# Patient Record
Sex: FEMALE | Race: WHITE | Employment: UNEMPLOYED | URBAN - METROPOLITAN AREA
[De-identification: names, ages, dates, MRNs, and addresses within clinical notes are randomized per-mention and may not be internally consistent; named-entity substitution may affect disease eponyms.]

---

## 2017-01-30 ENCOUNTER — OFFICE VISIT (OUTPATIENT)
Dept: ENDOCRINOLOGY | Age: 72
End: 2017-01-30

## 2017-01-30 VITALS
HEART RATE: 93 BPM | WEIGHT: 142.6 LBS | HEIGHT: 64 IN | SYSTOLIC BLOOD PRESSURE: 178 MMHG | DIASTOLIC BLOOD PRESSURE: 75 MMHG | BODY MASS INDEX: 24.34 KG/M2

## 2017-01-30 LAB — HBA1C MFR BLD HPLC: 8 %

## 2017-01-30 RX ORDER — INSULIN GLARGINE 100 [IU]/ML
INJECTION, SOLUTION SUBCUTANEOUS
Refills: 3 | COMMUNITY
Start: 2016-11-06 | End: 2017-01-30 | Stop reason: SDUPTHER

## 2017-01-30 RX ORDER — LANCETS
EACH MISCELLANEOUS
Qty: 600 EACH | Refills: 3 | Status: SHIPPED | OUTPATIENT
Start: 2017-01-30 | End: 2018-05-29

## 2017-01-30 RX ORDER — INSULIN LISPRO 100 [IU]/ML
INJECTION, SOLUTION INTRAVENOUS; SUBCUTANEOUS
Refills: 3 | COMMUNITY
Start: 2016-11-07 | End: 2017-01-30 | Stop reason: SDUPTHER

## 2017-01-30 RX ORDER — INSULIN GLARGINE 100 [IU]/ML
19 INJECTION, SOLUTION SUBCUTANEOUS DAILY
Qty: 30 ML | Refills: 3 | Status: SHIPPED | OUTPATIENT
Start: 2017-01-30 | End: 2017-04-30

## 2017-01-30 RX ORDER — INSULIN LISPRO 100 [IU]/ML
INJECTION, SOLUTION INTRAVENOUS; SUBCUTANEOUS
Qty: 30 ML | Refills: 3 | Status: SHIPPED | OUTPATIENT
Start: 2017-01-30 | End: 2018-04-03 | Stop reason: SDUPTHER

## 2017-01-30 RX ORDER — OSELTAMIVIR PHOSPHATE 75 MG/1
CAPSULE ORAL
COMMUNITY
Start: 2017-01-17 | End: 2017-01-30 | Stop reason: ALTCHOICE

## 2017-01-30 RX ORDER — PEN NEEDLE, DIABETIC 30 GX3/16"
NEEDLE, DISPOSABLE MISCELLANEOUS
Qty: 400 PEN NEEDLE | Refills: 3 | Status: SHIPPED | OUTPATIENT
Start: 2017-01-30 | End: 2018-05-29

## 2017-01-30 RX ORDER — HYDROCODONE BITARTRATE AND ACETAMINOPHEN 5; 325 MG/1; MG/1
TABLET ORAL
Status: ON HOLD | COMMUNITY
Start: 2016-12-01 | End: 2017-10-02

## 2017-01-30 RX ORDER — IRBESARTAN AND HYDROCHLOROTHIAZIDE 150; 12.5 MG/1; MG/1
2 TABLET, FILM COATED ORAL DAILY
Qty: 180 TAB | Refills: 3 | Status: SHIPPED | OUTPATIENT
Start: 2017-01-30 | End: 2017-11-06 | Stop reason: SDUPTHER

## 2017-01-30 NOTE — PROGRESS NOTES
Chief Complaint   Patient presents with    New Patient    Diabetes     Type 1  diabetes. Last A1C - 7.2% - May 2016. Patient is testing BS 4 times - before meals and at bedtime.  Diabetic Foot Exam     Patient states that Dr. Hawa Up performed last foot exam.     Other     Patient states that she had an eye exam a week ago - [facility]. HPI  Ortega Goodrich returns for follow-up of her type 1 diabetes mellitus. I last saw her in [place]. Her last A1c was 7.2%. Her A1c today is 8.0%. Unfortunately she is experienced high blood sugars over the holidays. She also had the flu the fell on the ice and broke her left radial head. This is now healed and she is back to exercising. But because of the fracture, she was unable to exercise for quite some time. And then of course the holidays themselves contributed to hyperglycemia. She continues to take Lantus insulin 19 units in the morning along with Humalog insulin 6-8 units with each of her 3 meals. She is back on the elliptical every day in the morning. She brought in a print out of her blood sugars in the average blood sugar for the last 30 days is 172 which is consistent with an A1c of 8%. Blood sugars for the last 2 weeks are much better now that she is back to exercising. Her diet is really unchanged. She remains concerned about her blood pressure which does continue to fluctuate. She is currently on Avapro 150/12.5 2 tablets daily although in the past we have had to cut that back for reasons that are not entirely clear to prevent hypotension. Most recently she has had home blood pressures that are in the 130/50. ROS  Patient denies any episodes of severe hypoglycemia.   She also denies chest pain, shortness of breath, constipation or diarrhea    Visit Vitals    /75 (BP 1 Location: Right arm, BP Patient Position: Sitting)    Pulse 93    Ht 5' 4\" (1.626 m)    Wt 142 lb 9.6 oz (64.7 kg)    BMI 24.48 kg/m2 Physical Examination: General appearance - alert, well appearing, and in no distress  Mental status - alert, oriented to person, place, and time  Neck - supple, no significant adenopathy, thyroid exam: thyroid is normal in size without nodules or tenderness  Chest - clear to auscultation, no wheezes, rales or rhonchi, symmetric air entry  Heart - normal rate and regular rhythm, systolic murmur 2/6 at 2nd left intercostal space  Abdomen - soft, nontender, nondistended, no masses or organomegaly  Extremities - peripheral pulses normal, no pedal edema, no clubbing or cyanosis    Diabetic foot exam:     Left: Reflexes 4+     Vibratory sensation normal    Filament test normal sensation with micro filament   Pulse DP: 2+ (normal)   Pulse PT: 2+ (normal)   Deformities: None  Right: Reflexes 4+   Vibratory sensation normal   Filament test normal sensation with micro filament   Pulse DP: 2+ (normal)   Pulse PT: 2+ (normal)   Deformities: None      ASSESSMENT & PLAN  I believe Georgia's blood sugars and A1c will improve going forward. She is back to exercising and her energy level is good. She is concerned about her blood pressure as she has been in the past.  I reviewed her imaging studies that were performed in July 2014. Those tests were unremarkable. We will continue the Avapro at its current dosing for now. We continue the insulin also at its current dosing. I will see her back in 4 months.

## 2017-01-31 RX ORDER — LEVOTHYROXINE SODIUM 150 UG/1
150 TABLET ORAL DAILY
Qty: 90 TAB | Refills: 3 | Status: SHIPPED | OUTPATIENT
Start: 2017-01-31 | End: 2018-01-03 | Stop reason: SDUPTHER

## 2017-01-31 NOTE — TELEPHONE ENCOUNTER
----- Message from Pauly Buitrago sent at 1/31/2017  2:16 PM EST -----  Regarding: phone call  Patient called to ask whether her Levothyroxine was called into her pharmacy yesterday? She didn't get it. Her number is:  [phone]. Thanks [employer].

## 2017-02-18 ENCOUNTER — TELEPHONE (OUTPATIENT)
Dept: ENDOCRINOLOGY | Age: 72
End: 2017-02-18

## 2017-02-18 NOTE — TELEPHONE ENCOUNTER
Mrs Marisela Hardin paged after she attempted to do an insulin injection with Humalog, had difficulty depressing the pen (much more resistance/difficulty) and then noted the metal needle was missing. She was concerned that perhaps the needle broke off during the injection. She did not feel anything, but often does not feel her injections. Importantly, she says she frequently reuses pen needles. After the injection she inspected her abdomen and could not see or feel any evidence of a needle. My impression:  Given that she reuses her needles, suspect needle tip was bent and then broke off prior to today's injection. This would explain why she had resistance trying to inject insulin. Considering this, I think it is most likely pen needle broke off prior to her attempting the injection before lunch. Although highly unlikely,  IF pen needle were to have broke off under her skin, recommend she observe area of attempted injection. If an infection or abscess were to arise she can see her GP and discuss possibility that foreign body may be under skin. Recommended she inspect her pen needles prior to using in the future. Because she was quite sure no insulin came out of pen, she had already repeated the lunch time injection to provide the insulin for the meal prior to paging me.

## 2017-05-26 ENCOUNTER — OFFICE VISIT (OUTPATIENT)
Dept: ENDOCRINOLOGY | Age: 72
End: 2017-05-26

## 2017-05-26 VITALS
WEIGHT: 133.4 LBS | SYSTOLIC BLOOD PRESSURE: 176 MMHG | HEIGHT: 64 IN | BODY MASS INDEX: 22.77 KG/M2 | HEART RATE: 84 BPM | DIASTOLIC BLOOD PRESSURE: 76 MMHG

## 2017-05-26 DIAGNOSIS — E03.9 ACQUIRED HYPOTHYROIDISM: ICD-10-CM

## 2017-05-26 DIAGNOSIS — E10.9 TYPE 1 DIABETES MELLITUS WITHOUT COMPLICATION (HCC): Primary | ICD-10-CM

## 2017-05-26 LAB — HBA1C MFR BLD HPLC: 7.8 %

## 2017-05-26 NOTE — PROGRESS NOTES
Get Edwards returns for follow-up of her type 1 diabetes mellitus with excellent glucose control on basal bolus insulin therapy. She is currently taking Lantus insulin 16 units in the morning and Humalog insulin 4-6 units with her meals. She continues to exercise on her elliptical every morning and remain active throughout the day. She records her blood sugars many times a day. Her average blood sugar on her readout is 148. Her A1c today is 7.8% and that is surprisingly high based on her blood sugar profile. In fact she has a number of low blood sugars in the 40s and 50s that she has to treat. Her diet is really unchanged. She denies any episodes of severe hypoglycemia but again has fairly frequent episodes of mild hypoglycemia. Examination  Blood pressure 176/76  Pulse 84  Weight 133  Lungs clear  Heart reveals a regular rate and rhythm with a 2/6 systolic ejection murmur  Abdomen soft and nontender  Extremities unremarkable  Feet are dry without ulceration or tissue breakdown and there is normal vibratory and light touch sensation    Impression type 1 diabetes mellitus with an A1c higher than her blood sugars would suggest.  Plan: All laboratory tests were obtained today and I also obtained a serum fructosamine level to determine whether her average blood sugar is lower than the A1c reflects today. I will call her with results of the test.    We spent more than 25 mintutes face to face, more than 50% was in counseling regarding diet, exercise and carbohydrate intake.

## 2017-05-27 LAB
ALBUMIN SERPL-MCNC: 4.1 G/DL (ref 3.6–4.8)
ALBUMIN/GLOB SERPL: 1.6 {RATIO} (ref 1.2–2.2)
ALP SERPL-CCNC: 72 IU/L (ref 39–117)
ALT SERPL-CCNC: 15 IU/L (ref 0–32)
AST SERPL-CCNC: 15 IU/L (ref 0–40)
BILIRUB SERPL-MCNC: 0.2 MG/DL (ref 0–1.2)
BUN SERPL-MCNC: 30 MG/DL (ref 8–27)
BUN/CREAT SERPL: 23 (ref 12–28)
CALCIUM SERPL-MCNC: 9 MG/DL (ref 8.7–10.3)
CHLORIDE SERPL-SCNC: 98 MMOL/L (ref 96–106)
CO2 SERPL-SCNC: 20 MMOL/L (ref 18–29)
CREAT SERPL-MCNC: 1.31 MG/DL (ref 0.57–1)
ERYTHROCYTE [DISTWIDTH] IN BLOOD BY AUTOMATED COUNT: 13.2 % (ref 12.3–15.4)
FERRITIN SERPL-MCNC: 100 NG/ML (ref 15–150)
FRUCTOSAMINE SERPL-SCNC: 346 UMOL/L (ref 0–285)
GLOBULIN SER CALC-MCNC: 2.6 G/DL (ref 1.5–4.5)
GLUCOSE SERPL-MCNC: 229 MG/DL (ref 65–99)
HCT VFR BLD AUTO: 31.7 % (ref 34–46.6)
HGB BLD-MCNC: 10 G/DL (ref 11.1–15.9)
INTERPRETATION: NORMAL
MCH RBC QN AUTO: 29.3 PG (ref 26.6–33)
MCHC RBC AUTO-ENTMCNC: 31.5 G/DL (ref 31.5–35.7)
MCV RBC AUTO: 93 FL (ref 79–97)
PLATELET # BLD AUTO: 351 X10E3/UL (ref 150–379)
POTASSIUM SERPL-SCNC: 5.7 MMOL/L (ref 3.5–5.2)
PROT SERPL-MCNC: 6.7 G/DL (ref 6–8.5)
RBC # BLD AUTO: 3.41 X10E6/UL (ref 3.77–5.28)
SODIUM SERPL-SCNC: 137 MMOL/L (ref 134–144)
T4 FREE SERPL-MCNC: 1.84 NG/DL (ref 0.82–1.77)
TSH SERPL DL<=0.005 MIU/L-ACNC: 0.1 UIU/ML (ref 0.45–4.5)
WBC # BLD AUTO: 9.3 X10E3/UL (ref 3.4–10.8)

## 2017-09-29 ENCOUNTER — OFFICE VISIT (OUTPATIENT)
Dept: ENDOCRINOLOGY | Age: 72
End: 2017-09-29

## 2017-09-29 VITALS
HEIGHT: 64 IN | HEART RATE: 77 BPM | DIASTOLIC BLOOD PRESSURE: 79 MMHG | BODY MASS INDEX: 22.81 KG/M2 | SYSTOLIC BLOOD PRESSURE: 185 MMHG | WEIGHT: 133.6 LBS

## 2017-09-29 DIAGNOSIS — E10.9 TYPE 1 DIABETES MELLITUS WITHOUT COMPLICATION (HCC): Primary | ICD-10-CM

## 2017-09-29 LAB — HBA1C MFR BLD HPLC: 8 %

## 2017-09-29 NOTE — PROGRESS NOTES
Ms. Javier Zhong returns for follow-up of her type 1 diabetes mellitus. She is currently on Lantus insulin recently decreased from 16 units to 15 units and Humalog insulin which she takes 2-4 units with her meals. She is recently gone on a very low-carb diet and increased activity regimen and this is resulted in some low blood sugars. Despite that her A1c today is 8.0%. She is on a diet called the whole 30 diet which is basically no dairy, no grains, and no processed foods. She feels great on the diet but she has a lot of low blood sugars on her download. She goes to the gym Monday Wednesday Friday and does 30-45 minutes of strength training along with 30 minutes of elliptical.  Every other day of the week she just does the 30 minutes of elliptical.  Breakfast can be an omelette with vegetables or fruit. Lunch can be soup or fruit and salad. Supper can be a similar meal or she can go out and they will have salmon with roast vegetables and salad. She is playing pickle ball at night. Review of her blood sugar profile indicates lots of episodes of blood sugars in the 40s and 50s. She has one episode where she thought all night to get her blood sugar back up. We believe that she probably took 2 doses of Humalog for her supper meal that night. Examination  Blood pressure 185/79  Pulse 77  Weight 133  Lungs clear  Heart reveals a regular rate and rhythm without murmurs rubs gallops  Abdomen soft and nontender  Extremities unremarkable. The feet are dry without ulceration or tissue breakdown and there is normal vibratory and light touch sensation. Impression   type 1 diabetes mellitus  Hypertension    Plan:  Regarding her diabetes, we have decreased her Lantus to 14 units and continue to Humalog at 2-4 units with her meals. Admittedly these are very low-carb meals.   I have asked her to send me blood sugar readings in 1 week so that we can see if the lower dose of Lantus will eliminate the low blood sugars. Regarding her hypertension, she has discovered that if she takes her usual dose of irbesartan, she has supine and upright hypotension particularly with exercise so she has several days where she has not taken the irbesartan at all and after several days her blood sugar begins to go up. So we have elected to try half a tablet of irbesartan 150 mg and see if this normalizes her blood pressure without hypotension. We will see her back in 4 months or sooner as needed. End dictation    We spent more than 25 mintutes face to face, more than 50% was in counseling regarding diet, exercise and carbohydrate intake.

## 2017-10-02 ENCOUNTER — ANESTHESIA EVENT (OUTPATIENT)
Dept: ENDOSCOPY | Age: 72
End: 2017-10-02
Payer: MEDICARE

## 2017-10-02 ENCOUNTER — ANESTHESIA (OUTPATIENT)
Dept: ENDOSCOPY | Age: 72
End: 2017-10-02
Payer: MEDICARE

## 2017-10-02 ENCOUNTER — HOSPITAL ENCOUNTER (OUTPATIENT)
Age: 72
Setting detail: OUTPATIENT SURGERY
Discharge: HOME | End: 2017-10-02
Attending: INTERNAL MEDICINE | Admitting: INTERNAL MEDICINE
Payer: MEDICARE

## 2017-10-02 VITALS
DIASTOLIC BLOOD PRESSURE: 71 MMHG | BODY MASS INDEX: 22.71 KG/M2 | HEIGHT: 64 IN | OXYGEN SATURATION: 100 % | TEMPERATURE: 98 F | RESPIRATION RATE: 14 BRPM | WEIGHT: 133 LBS | HEART RATE: 66 BPM | SYSTOLIC BLOOD PRESSURE: 153 MMHG

## 2017-10-02 LAB
GLUCOSE BLD STRIP.AUTO-MCNC: 100 MG/DL (ref 65–100)
SERVICE CMNT-IMP: NORMAL

## 2017-10-02 PROCEDURE — 76060000031 HC ANESTHESIA FIRST 0.5 HR: Performed by: INTERNAL MEDICINE

## 2017-10-02 PROCEDURE — 77030009426 HC FCPS BIOP ENDOSC BSC -B: Performed by: INTERNAL MEDICINE

## 2017-10-02 PROCEDURE — 76040000019: Performed by: INTERNAL MEDICINE

## 2017-10-02 PROCEDURE — 77030027957 HC TBNG IRR ENDOGTR BUSS -B: Performed by: INTERNAL MEDICINE

## 2017-10-02 PROCEDURE — 88305 TISSUE EXAM BY PATHOLOGIST: CPT | Performed by: INTERNAL MEDICINE

## 2017-10-02 PROCEDURE — 82962 GLUCOSE BLOOD TEST: CPT

## 2017-10-02 PROCEDURE — 74011250636 HC RX REV CODE- 250/636

## 2017-10-02 RX ORDER — SODIUM CHLORIDE 0.9 % (FLUSH) 0.9 %
5-10 SYRINGE (ML) INJECTION AS NEEDED
Status: DISCONTINUED | OUTPATIENT
Start: 2017-10-02 | End: 2017-10-02 | Stop reason: HOSPADM

## 2017-10-02 RX ORDER — FLUMAZENIL 0.1 MG/ML
0.2 INJECTION INTRAVENOUS
Status: DISCONTINUED | OUTPATIENT
Start: 2017-10-02 | End: 2017-10-02 | Stop reason: HOSPADM

## 2017-10-02 RX ORDER — DEXTROMETHORPHAN/PSEUDOEPHED 2.5-7.5/.8
1.2 DROPS ORAL
Status: DISCONTINUED | OUTPATIENT
Start: 2017-10-02 | End: 2017-10-02 | Stop reason: HOSPADM

## 2017-10-02 RX ORDER — SODIUM CHLORIDE 9 MG/ML
100 INJECTION, SOLUTION INTRAVENOUS CONTINUOUS
Status: DISCONTINUED | OUTPATIENT
Start: 2017-10-02 | End: 2017-10-02 | Stop reason: HOSPADM

## 2017-10-02 RX ORDER — MIDAZOLAM HYDROCHLORIDE 1 MG/ML
.25-1 INJECTION, SOLUTION INTRAMUSCULAR; INTRAVENOUS
Status: DISCONTINUED | OUTPATIENT
Start: 2017-10-02 | End: 2017-10-02 | Stop reason: HOSPADM

## 2017-10-02 RX ORDER — FENTANYL CITRATE 50 UG/ML
100 INJECTION, SOLUTION INTRAMUSCULAR; INTRAVENOUS
Status: DISCONTINUED | OUTPATIENT
Start: 2017-10-02 | End: 2017-10-02 | Stop reason: HOSPADM

## 2017-10-02 RX ORDER — DIPHENHYDRAMINE HYDROCHLORIDE 50 MG/ML
50 INJECTION, SOLUTION INTRAMUSCULAR; INTRAVENOUS ONCE
Status: DISCONTINUED | OUTPATIENT
Start: 2017-10-02 | End: 2017-10-02 | Stop reason: HOSPADM

## 2017-10-02 RX ORDER — SODIUM CHLORIDE 0.9 % (FLUSH) 0.9 %
5-10 SYRINGE (ML) INJECTION EVERY 8 HOURS
Status: DISCONTINUED | OUTPATIENT
Start: 2017-10-02 | End: 2017-10-02 | Stop reason: HOSPADM

## 2017-10-02 RX ORDER — NALOXONE HYDROCHLORIDE 0.4 MG/ML
0.4 INJECTION, SOLUTION INTRAMUSCULAR; INTRAVENOUS; SUBCUTANEOUS
Status: DISCONTINUED | OUTPATIENT
Start: 2017-10-02 | End: 2017-10-02 | Stop reason: HOSPADM

## 2017-10-02 RX ORDER — EPINEPHRINE 0.1 MG/ML
1 INJECTION INTRACARDIAC; INTRAVENOUS
Status: DISCONTINUED | OUTPATIENT
Start: 2017-10-02 | End: 2017-10-02 | Stop reason: HOSPADM

## 2017-10-02 RX ORDER — PROPOFOL 10 MG/ML
INJECTION, EMULSION INTRAVENOUS AS NEEDED
Status: DISCONTINUED | OUTPATIENT
Start: 2017-10-02 | End: 2017-10-02 | Stop reason: HOSPADM

## 2017-10-02 RX ORDER — SODIUM CHLORIDE 9 MG/ML
INJECTION, SOLUTION INTRAVENOUS
Status: DISCONTINUED | OUTPATIENT
Start: 2017-10-02 | End: 2017-10-02 | Stop reason: HOSPADM

## 2017-10-02 RX ORDER — ATROPINE SULFATE 0.1 MG/ML
0.5 INJECTION INTRAVENOUS
Status: DISCONTINUED | OUTPATIENT
Start: 2017-10-02 | End: 2017-10-02 | Stop reason: HOSPADM

## 2017-10-02 RX ADMIN — PROPOFOL 50 MG: 10 INJECTION, EMULSION INTRAVENOUS at 09:15

## 2017-10-02 RX ADMIN — SODIUM CHLORIDE: 9 INJECTION, SOLUTION INTRAVENOUS at 09:10

## 2017-10-02 RX ADMIN — PROPOFOL 100 MG: 10 INJECTION, EMULSION INTRAVENOUS at 09:13

## 2017-10-02 RX ADMIN — PROPOFOL 50 MG: 10 INJECTION, EMULSION INTRAVENOUS at 09:17

## 2017-10-02 RX ADMIN — PROPOFOL 50 MG: 10 INJECTION, EMULSION INTRAVENOUS at 09:21

## 2017-10-02 NOTE — ANESTHESIA PREPROCEDURE EVALUATION
Anesthetic History               Review of Systems / Medical History  Patient summary reviewed, nursing notes reviewed and pertinent labs reviewed    Pulmonary                Comments: Seasonal allergy cough   Neuro/Psych              Cardiovascular    Hypertension                   GI/Hepatic/Renal               Comments: diverticulosis Endo/Other    Diabetes: well controlled, type 1, using insulin         Other Findings            Physical Exam    Airway  Mallampati: I  TM Distance: > 6 cm  Neck ROM: normal range of motion   Mouth opening: Normal     Cardiovascular    Rhythm: regular  Rate: normal         Dental  No notable dental hx       Pulmonary  Breath sounds clear to auscultation               Abdominal         Other Findings            Anesthetic Plan    ASA: 2  Anesthesia type: MAC          Induction: Intravenous  Anesthetic plan and risks discussed with: Patient

## 2017-10-02 NOTE — ANESTHESIA POSTPROCEDURE EVALUATION
Post-Anesthesia Evaluation and Assessment    Patient: Zeferino Flores MRN: [id number]  SSN: [id number]    YOB: 1945  Age: 81 y.o. Sex: female       Cardiovascular Function/Vital Signs  Visit Vitals    /52    Pulse 68    Temp 36.7 °C (98 °F)    Resp 16    Ht 5' 4\" (1.626 m)    Wt 60.3 kg (133 lb)    SpO2 100%    BMI 22.83 kg/m2       Patient is status post MAC anesthesia for Procedure(s):  COLONOSCOPY  ENDOSCOPIC POLYPECTOMY. Nausea/Vomiting: None    Postoperative hydration reviewed and adequate. Pain:  Pain Scale 1: Numeric (0 - 10) (10/02/17 0852)  Pain Intensity 1: 0 (10/02/17 0857)   Managed    Neurological Status: At baseline    Mental Status and Level of Consciousness: Arousable    Pulmonary Status:   O2 Device: Nasal cannula (10/02/17 0921)   Adequate oxygenation and airway patent    Complications related to anesthesia: None    Post-anesthesia assessment completed.  No concerns    Signed By: Michelle Canela MD     October 2, 2017

## 2017-10-02 NOTE — H&P
[address] [facility], [address] [facility]      History and Physical       NAME:  Esequiel Menjivar   :   1945   MRN:   [id number]             History of Present Illness:  Patient is a 81 y.o. who is seen for personal history of colon polyps. Last colonoscopy was in  at which time polyps were removed. PMH:  Past Medical History:   Diagnosis Date    Hypertension     Hypothyroidism     Type I diabetes mellitus ([address]        PSH:  Past Surgical History:   Procedure Laterality Date    HX APPENDECTOMY      HX  SECTION      HX TONSILLECTOMY      HX TUBAL LIGATION         Allergies: Allergies   Allergen Reactions    Penicillins Unknown (comments)       Home Medications:  Prior to Admission Medications   Prescriptions Last Dose Informant Patient Reported? Taking? HUMALOG KWIKPEN 100 unit/mL kwikpen 10/1/2017 at Unknown time  No Yes   Sig: INJECT 6 TO 12 UNITS SUBCUTANEOUSLY 3 TIMES A DAY BEFORE MEALS AS DIRECTED  Indications: type 1 diabetes mellitus   Insulin Needles, Disposable, 31 gauge x 5/16\" ndle   No No   Sig: Use as directed with insulin pen 4 times daily   Lancets misc 10/1/2017 at Unknown time  No Yes   Sig: Monitor blood sugar 6 times daily   glucose blood VI test strips (ONETOUCH VERIO) strip 10/1/2017 at Unknown time  No Yes   Sig: Monitor blood sugar 6 times daily   irbesartan-hydroCHLOROthiazide (AVALIDE) 150-12.5 mg per tablet 10/1/2017 at Unknown time  No Yes   Sig: Take 2 Tabs by mouth daily. levothyroxine (SYNTHROID) 150 mcg tablet 2017  No No   Sig: Take 1 Tab by mouth daily.       Facility-Administered Medications: None       Hospital Medications:  Current Facility-Administered Medications   Medication Dose Route Frequency    0.9% sodium chloride infusion  100 mL/hr IntraVENous CONTINUOUS    sodium chloride (NS) flush 5-10 mL  5-10 mL IntraVENous Q8H    sodium chloride (NS) flush 5-10 mL  5-10 mL IntraVENous PRN    midazolam (VERSED) injection 0.25-10 mg  0.25-10 mg IntraVENous Multiple    fentaNYL citrate (PF) injection 100 mcg  100 mcg IntraVENous Multiple    naloxone (NARCAN) injection 0.4 mg  0.4 mg IntraVENous Multiple    flumazenil (ROMAZICON) 0.1 mg/mL injection 0.2 mg  0.2 mg IntraVENous Multiple    simethicone (MYLICON) 74OH/8.2VF oral drops 80 mg  1.2 mL Oral Multiple    diphenhydrAMINE (BENADRYL) injection 50 mg  50 mg IntraVENous ONCE    atropine injection 0.5 mg  0.5 mg IntraVENous ONCE PRN    EPINEPHrine (ADRENALIN) 0.1 mg/mL syringe 1 mg  1 mg Endoscopically ONCE PRN       Social History:  Social History   Substance Use Topics    Smoking status: Never Smoker    Smokeless tobacco: Never Used    Alcohol use No       Family History:  No family history on file. Review of Systems:      Constitutional: negative fever, negative chills, negative weight loss  Eyes:   negative visual changes  ENT:   negative sore throat, tongue or lip swelling  Respiratory:  negative cough, negative dyspnea  Cards:  negative for chest pain, palpitations, lower extremity edema  GI:   See HPI  :  negative for frequency, dysuria  Integument:  negative for rash and pruritus  Heme:  negative for easy bruising and gum/nose bleeding  Musculoskel: negative for myalgias,  back pain and muscle weakness  Neuro: negative for headaches, dizziness, vertigo  Psych:  negative for feelings of anxiety, depression       Objective:   Patient Vitals for the past 8 hrs:   BP Temp Pulse Resp SpO2 Height Weight   10/02/17 0852 (!) 205/80 97.8 °F (36.6 °C) 73 20 98 % 5' 4\" (1.626 m) 60.3 kg (133 lb)             EXAM:     NEURO-a&o   HEENT-wnl   LUNGS-clear    COR-regular rate and rhythym     ABD-soft , no tenderness, no rebound, good bs     EXT-no edema     Data Review     No results for input(s): WBC, HGB, HCT, PLT, HGBEXT, HCTEXT, PLTEXT in the last 72 hours. No results for input(s): NA, K, CL, CO2, BUN, CREA, GLU, PHOS, CA in the last 72 hours.   No results for input(s): SGOT, GPT, AP, TBIL, TP, ALB, GLOB, GGT, AML, LPSE in the last 72 hours. No lab exists for component: AMYP, HLPSE  No results for input(s): INR, PTP, APTT in the last 72 hours. No lab exists for component: INREXT       Assessment:   · History of colon polyps     Patient Active Problem List   Diagnosis Code    Type 1 diabetes mellitus without complication ([facility] N41.7     Plan:   · Endoscopic procedure with MAC     Signed By: Елена Ayala.  Olive Lock MD     10/2/2017  9:04 AM

## 2017-10-02 NOTE — DISCHARGE INSTRUCTIONS
[address]   [id number]  1945    Discomfort:  Redness at IV site- apply warm compress to area; if redness or soreness persist- contact your physician  There may be a slight amount of blood passed from the rectum  Gaseous discomfort- walking, belching will help relieve any discomfort  You may not operate a vehicle for 12 hours  You may not engage in an occupation involving machinery or appliances for rest of today  You may not drink alcoholic beverages for at least 12 hours  Avoid making any critical decisions for at least 24 hour  DIET:  You may resume your regular diet - however -  remember your colon is empty and a heavy meal will produce gas. Avoid these foods:  vegetables, fried / greasy foods, carbonated drinks     ACTIVITY:  You may  resume your normal daily activities it is recommended that you spend the remainder of the day resting -  avoid any strenuous activity. CALL M.D. ANY SIGN OF:   Increasing pain, nausea, vomiting  Abdominal distension (swelling)  New increased bleeding (oral or rectal)  Fever (chills)  Pain in chest area  Bloody discharge from nose or mouth  Shortness of breath      Follow-up Instructions:   Call Dr. Bereket Evangelista for any questions or problems at [address] [place] [address] St:   Your colonoscopy showed one polyp, which was removed. We will contact you about the pathology results and when your next colonoscopy will be due. Telephone # [phone]      Signed By: Stephen Harrison.  Hilary Garibay MD     10/2/2017  9:31 AM       DISCHARGE SUMMARY from Nurse    The following personal items collected during your admission are returned to you:   Dental Appliance: Dental Appliances: None  Vision: Visual Aid: None  Hearing Aid:    Jewelry:    Clothing:    Other Valuables:    Valuables sent to safe:

## 2017-10-02 NOTE — PERIOP NOTES

## 2017-10-02 NOTE — PROCEDURES
[address] [facility], [address] [facility]        Colonoscopy Operative Report    Alee Butler  [id number]  1945      Procedure Type:   Colonoscopy with polypectomy (cold biopsy)     Indications:    Personal history of colon polyps (screening only)         Pre-operative Diagnosis: see indication above    Post-operative Diagnosis:  See findings below    :  Mandie Quiroz MD      Referring Provider: Regla Gilliland MD      Sedation:  MAC anesthesia Propofol      Procedure Details:  After informed consent was obtained with all risks and benefits of procedure explained and preoperative exam completed, the patient was taken to the endoscopy suite and placed in the left lateral decubitus position. Upon sequential sedation as per above, a digital rectal exam was performed demonstrating internal hemorrhoids. The Olympus pediatric videocolonoscope  was inserted in the rectum and carefully advanced to the cecum, which was identified by the ileocecal valve and appendiceal orifice. The cecum was identified by the ileocecal valve and appendiceal orifice. The quality of preparation was good. The colonoscope was slowly withdrawn with careful evaluation between folds. Retroflexion in the rectum was completed . Findings:   Rectum: small internal hemorrhoids  Sigmoid: normal  Descending Colon: normal  Transverse Colon: normal  Ascending Colon: single sessile 4 mm polyp - removed with cold biopsy forceps  Cecum: normal  Terminal Ileum: not intubated      Specimen Removed:  1. Ascending colon polyp    Complications: None. EBL:  None. Impression:     1. Ascending colon polyp - removed  2. Small internal hemorrhoids    Recommendations:  1. Follow up surgical pathology  2. Repeat colonoscopy in 5 years  3. High fiber diet education    Signed By: Mandie Levy.  Bj Quiroz MD     10/2/2017  9:34 AM

## 2017-10-02 NOTE — IP AVS SNAPSHOT
[address] 
[phone] Patient: Alee Butler MRN: [id number] ECX:3/6/[birth year]92 You are allergic to the following Allergen Reactions Penicillins Unknown (comments) Recent Documentation Height Weight BMI OB Status Smoking Status 1.626 m 60.3 kg 22.83 kg/m2 Postmenopausal Never Smoker Emergency Contacts Name Discharge Info Relation Home Work Mobile EstelaMarlo rosenbaum A DISCHARGE CAREGIVER [3] Spouse [3] [phone] [address]  Patient [10] [phone] About your hospitalization You were admitted on:  October 2, 2017 You last received care in the:  [facility] ENDOSCOPY You were discharged on:  October 2, 2017 Unit phone number:  [phone] Why you were hospitalized Your primary diagnosis was:  Not on File Providers Seen During Your Hospitalizations Provider Role Specialty Primary office phone Jerry Quiroz MD Attending Provider Gastroenterology [phone] Your Primary Care Physician (PCP) Primary Care Physician Office Phone Office Fax Elian Avila [phone] Follow-up Information None Current Discharge Medication List  
  
CONTINUE these medications which have NOT CHANGED Dose & Instructions Dispensing Information Comments Morning Noon Evening Bedtime  
 glucose blood VI test strips strip Commonly known as:  Jillian Mix Your last dose was: Your next dose is:    
   
   
 Monitor blood sugar 6 times daily Quantity:  600 Strip Refills:  3 HumaLOG KwikPen 100 unit/mL kwikpen Generic drug:  insulin lispro Your last dose was: Your next dose is: INJECT 6 TO 12 UNITS SUBCUTANEOUSLY 3 TIMES A DAY BEFORE MEALS AS DIRECTED  Indications: type 1 diabetes mellitus Quantity:  30 mL Refills:  3 Insulin Needles (Disposable) 31 gauge x 5/16\" Ndle Your last dose was: Your next dose is:    
   
   
 Use as directed with insulin pen 4 times daily Quantity:  400 Pen Needle Refills:  3  
     
   
   
   
  
 irbesartan-hydroCHLOROthiazide 150-12.5 mg per tablet Commonly known as:  AVALIDE Your last dose was: Your next dose is:    
   
   
 Dose:  2 Tab Take 2 Tabs by mouth daily. Quantity:  180 Tab Refills:  3 Lancets Misc Your last dose was: Your next dose is:    
   
   
 Monitor blood sugar 6 times daily Quantity:  600 Each Refills:  3  
     
   
   
   
  
 levothyroxine 150 mcg tablet Commonly known as:  SYNTHROID Your last dose was: Your next dose is:    
   
   
 Dose:  150 mcg Take 1 Tab by mouth daily. Quantity:  90 Tab Refills:  3 Discharge Instructions [address] 
[address] COLON DISCHARGE INSTRUCTIONS Kat Palmer [id number] 
1945 Discomfort: 
Redness at IV site- apply warm compress to area; if redness or soreness persist- contact your physician There may be a slight amount of blood passed from the rectum Gaseous discomfort- walking, belching will help relieve any discomfort You may not operate a vehicle for 12 hours You may not engage in an occupation involving machinery or appliances for rest of today You may not drink alcoholic beverages for at least 12 hours Avoid making any critical decisions for at least 24 hour DIET: 
You may resume your regular diet  however -  remember your colon is empty and a heavy meal will produce gas. Avoid these foods:  vegetables, fried / greasy foods, carbonated drinks ACTIVITY: 
You may  resume your normal daily activities it is recommended that you spend the remainder of the day resting -  avoid any strenuous activity. CALL [facility] ANY SIGN OF: Increasing pain, nausea, vomiting Abdominal distension (swelling) New increased bleeding (oral or rectal) Fever (chills) Pain in chest area Bloody discharge from nose or mouth Shortness of breath Follow-up Instructions: 
 Call Dr. Bryce Harp for any questions or problems at [phone] ENDOSCOPY FINDINGS: 
 Your colonoscopy showed one polyp, which was removed. We will contact you about the pathology results and when your next colonoscopy will be due. Telephone # [phone] Signed By: Demetrius Chamberlain. Cesario Spencer MD   
 10/2/2017  9:31 AM 
  
 
DISCHARGE SUMMARY from Nurse The following personal items collected during your admission are returned to you:  
Dental Appliance: Dental Appliances: None Vision: Visual Aid: None Hearing Aid:   
Jewelry:   
Clothing:   
Other Valuables:   
Valuables sent to safe:   
 
 
 
Discharge Orders None Introducing [facility]! Serafin Moreau introduces [employer] patient portal. Now you can access parts of your medical record, email your doctor's office, and request medication refills online. 1. In your internet browser, go to [email or web address] 2. Click on the First Time User? Click Here link in the Sign In box. You will see the New Member Sign Up page. 3. Enter your [employer] Access Code exactly as it appears below. You will not need to use this code after youve completed the sign-up process. If you do not sign up before the expiration date, you must request a new code. · [employer] Access Code: [email or web address] Expires: 12/31/2017  9:47 AM 
 
4. Enter the last four digits of your Social Security Number (xxxx) and Date of Birth (mm/dd/yyyy) as indicated and click Submit. You will be taken to the next sign-up page. 5. Create a [employer] ID. This will be your [employer] login ID and cannot be changed, so think of one that is secure and easy to remember. 6. Create a [employer]se password. You can change your password at any time. 7. Enter your Password Reset Question and Answer. This can be used at a later time if you forget your password. 8. Enter your e-mail address. You will receive e-mail notification when new information is available in [address] 9. Click Sign Up. You can now view and download portions of your medical record. 10. Click the Download Summary menu link to download a portable copy of your medical information. If you have questions, please visit the Frequently Asked Questions section of the [employer] website. Remember, [employer]se is NOT to be used for urgent needs. For medical emergencies, dial 911. Now available from your iPhone and Android! General Information Please provide this summary of care documentation to your next provider. Patient Signature:  ____________________________________________________________ Date:  ____________________________________________________________  
  
Pastora López Provider Signature:  ____________________________________________________________ Date:  ____________________________________________________________

## 2017-10-02 NOTE — ROUTINE PROCESS
Yahir Palmer  1945  [id number]    Situation:  Verbal report received from: Roosvelt Dubin  Procedure: Procedure(s):  COLONOSCOPY  ENDOSCOPIC POLYPECTOMY    Background:    Preoperative diagnosis: POLYPS  Postoperative diagnosis: Ascending colon polyp  Internal Hemorrhoids    :  Dr. Millie Honeycutt  Assistant(s): Endoscopy Technician-1: Eugenio Goins  Endoscopy RN-1: Baltazar Vargas RN    Specimens:   ID Type Source Tests Collected by Time Destination   1 : polyp Preservative Colon, Ascending  Jerry Landon MD 10/2/2017 7662 Pathology     H. Pylori  no    Assessment:  Intra-procedure medications   Anesthesia gave intra-procedure sedation and medications, see anesthesia flow sheet yes    Intravenous fluids: NS@ KVO     Vital signs stable yes    Abdominal assessment: round and soft yes    Recommendation:  Discharge patient per MD order yes .   Return to floor na  Family or Friend  fam  Permission to share finding with family or friend yes

## 2017-11-06 RX ORDER — IRBESARTAN AND HYDROCHLOROTHIAZIDE 150; 12.5 MG/1; MG/1
2 TABLET, FILM COATED ORAL DAILY
Qty: 180 TAB | Refills: 3 | Status: SHIPPED | OUTPATIENT
Start: 2017-11-06 | End: 2017-12-14 | Stop reason: SDUPTHER

## 2017-11-29 RX ORDER — INSULIN GLARGINE 100 [IU]/ML
INJECTION, SOLUTION SUBCUTANEOUS
Qty: 30 PEN | Refills: 3 | Status: SHIPPED | OUTPATIENT
Start: 2017-11-29 | End: 2018-05-29

## 2017-12-14 RX ORDER — IRBESARTAN AND HYDROCHLOROTHIAZIDE 150; 12.5 MG/1; MG/1
TABLET, FILM COATED ORAL
Qty: 180 TAB | Refills: 0 | Status: SHIPPED | OUTPATIENT
Start: 2017-12-14 | End: 2018-05-29

## 2018-01-01 ENCOUNTER — OFFICE VISIT (OUTPATIENT)
Dept: ENDOCRINOLOGY | Age: 73
End: 2018-01-01

## 2018-01-01 VITALS
HEART RATE: 66 BPM | WEIGHT: 127.8 LBS | HEIGHT: 64 IN | SYSTOLIC BLOOD PRESSURE: 205 MMHG | DIASTOLIC BLOOD PRESSURE: 83 MMHG | BODY MASS INDEX: 21.82 KG/M2

## 2018-01-01 VITALS
SYSTOLIC BLOOD PRESSURE: 181 MMHG | DIASTOLIC BLOOD PRESSURE: 75 MMHG | BODY MASS INDEX: 22.13 KG/M2 | HEART RATE: 67 BPM | HEIGHT: 64 IN | WEIGHT: 129.6 LBS

## 2018-01-01 DIAGNOSIS — M71.21 BAKER'S CYST OF KNEE, RIGHT: Primary | ICD-10-CM

## 2018-01-01 DIAGNOSIS — E10.22 TYPE 1 DIABETES MELLITUS WITH STAGE 4 CHRONIC KIDNEY DISEASE (HCC): Primary | ICD-10-CM

## 2018-01-01 DIAGNOSIS — N18.4 TYPE 1 DIABETES MELLITUS WITH STAGE 4 CHRONIC KIDNEY DISEASE (HCC): ICD-10-CM

## 2018-01-01 DIAGNOSIS — N18.4 TYPE 1 DIABETES MELLITUS WITH STAGE 4 CHRONIC KIDNEY DISEASE (HCC): Primary | ICD-10-CM

## 2018-01-01 DIAGNOSIS — E10.22 TYPE 1 DIABETES MELLITUS WITH STAGE 4 CHRONIC KIDNEY DISEASE (HCC): ICD-10-CM

## 2018-01-01 LAB
CREATININE, EXTERNAL: 3.8
HBA1C MFR BLD HPLC: 7.5 %

## 2018-01-01 RX ORDER — INSULIN LISPRO 100 [IU]/ML
INJECTION, SOLUTION INTRAVENOUS; SUBCUTANEOUS
Qty: 10 PEN | Refills: 3 | Status: SHIPPED | OUTPATIENT
Start: 2018-01-01 | End: 2019-01-01 | Stop reason: SDUPTHER

## 2018-01-03 DIAGNOSIS — E03.9 ACQUIRED HYPOTHYROIDISM: Primary | ICD-10-CM

## 2018-01-03 RX ORDER — LEVOTHYROXINE SODIUM 150 UG/1
TABLET ORAL
Qty: 90 TAB | Refills: 3 | Status: SHIPPED | OUTPATIENT
Start: 2018-01-03 | End: 2018-05-29 | Stop reason: DRUGHIGH

## 2018-01-26 ENCOUNTER — OFFICE VISIT (OUTPATIENT)
Dept: ENDOCRINOLOGY | Age: 73
End: 2018-01-26

## 2018-01-26 VITALS
HEIGHT: 64 IN | SYSTOLIC BLOOD PRESSURE: 192 MMHG | HEART RATE: 73 BPM | BODY MASS INDEX: 20.73 KG/M2 | DIASTOLIC BLOOD PRESSURE: 81 MMHG | WEIGHT: 121.4 LBS

## 2018-01-26 DIAGNOSIS — E10.9 TYPE 1 DIABETES MELLITUS WITHOUT COMPLICATION (HCC): Primary | ICD-10-CM

## 2018-01-26 PROBLEM — E11.21 TYPE 2 DIABETES MELLITUS WITH NEPHROPATHY (HCC): Status: ACTIVE | Noted: 2018-01-26

## 2018-01-26 LAB — HBA1C MFR BLD HPLC: 7.5 %

## 2018-01-26 RX ORDER — AMLODIPINE BESYLATE 5 MG/1
5 TABLET ORAL DAILY
Qty: 30 TAB | Refills: 3 | Status: SHIPPED | OUTPATIENT
Start: 2018-01-26 | End: 2018-04-24 | Stop reason: SDUPTHER

## 2018-01-26 NOTE — PROGRESS NOTES
Ms. Arlyn Chang returns for follow-up of her type 1 diabetes mellitus and primary hypothyroidism. She is currently on 14 units of Lantus insulin in the morning and Humalog 4-5 units with each of her meals. Overall her blood sugars have been doing very well. Her hemoglobin A1c today is 7.5 which is significantly improved. We downloaded her meter. Her average blood sugar is about 168 which is consistent with the A1c of 7.5%. She does have some episodes of low blood sugar many of which are associated with exercise. She goes to the gym 5 days a week and does stair climber strength training and machines. She occasionally takes a unit of insulin before she goes because otherwise she feels like her blood sugars go up with exercise. She has not had any episodes of severe hypoglycemia since we last saw her last.  She is concerned about her blood pressure. She is currently on Avalide 150/12.52 tablets daily. Examination  Blood pressure 192/81  Pulse 73  Weight 121 which is down 8 pounds    HEENT unremarkable  Lungs clear  Heart reveals a regular rate and rhythm without murmurs rubs or gallops  Abdomen benign  Extremities unremarkable  Feet are dry without ulceration or tissue breakdown. There was normal vibratory and light touch sensation. Impression type 1 diabetes mellitus with improving glucose control. We have recommended that she pursue a Dex com which is turns out is something that she has been considering for some time. Regarding her blood pressure, we have added amlodipine 5 mg to her current medications in hopes of improving her systolic hypertension. We will see her back in 4 months. She will also return for instruction on the Dex com when she gets the device. We spent more than 25 mintutes face to face, more than 50% was in counseling regarding diet, exercise and carbohydrate intake.

## 2018-02-23 ENCOUNTER — TELEPHONE (OUTPATIENT)
Dept: ENDOCRINOLOGY | Age: 73
End: 2018-02-23

## 2018-02-23 NOTE — TELEPHONE ENCOUNTER
Patient stated that she spoke with [facility] and they are requesting that Dr. Leidy Jimenez fax them a certificate of medical necessity as well as her chart notes so that the patient can get her Dexcom monitor. The fax is [phone].

## 2018-02-26 ENCOUNTER — TELEPHONE (OUTPATIENT)
Dept: ENDOCRINOLOGY | Age: 73
End: 2018-02-26

## 2018-02-26 NOTE — TELEPHONE ENCOUNTER
I spoke to the patient's  and advised him that I had filled out the paperwork on Friday and it was faxed to the [address] [place] [address] [address]. They should be receiving confirmation shortly.

## 2018-02-26 NOTE — TELEPHONE ENCOUNTER
Returned pt's call. Patient x1 id verified. Pt stated that she wants Dr. Katya Crouch to fill out Certificate Medical Necessity form.     Per Pt they provide her 2 fax number to fax to  [phone]. [phone]

## 2018-03-21 ENCOUNTER — OFFICE VISIT (OUTPATIENT)
Dept: ENDOCRINOLOGY | Age: 73
End: 2018-03-21

## 2018-03-21 VITALS
DIASTOLIC BLOOD PRESSURE: 66 MMHG | RESPIRATION RATE: 12 BRPM | BODY MASS INDEX: 20.73 KG/M2 | WEIGHT: 121.4 LBS | HEART RATE: 80 BPM | SYSTOLIC BLOOD PRESSURE: 181 MMHG | HEIGHT: 64 IN

## 2018-03-21 DIAGNOSIS — E10.9 TYPE 1 DIABETES MELLITUS WITHOUT COMPLICATION (HCC): Primary | ICD-10-CM

## 2018-03-21 NOTE — PROGRESS NOTES
Presentation:   Kalyan Cunningham is a 81 y.o. female with Type 1 diabetes who comes for diabetes self-management training using DexCom continuous glucose monitoring system. On basal/bolus insulin therapy per Delfina Alston MD. A1c 7.5% in January 2018. Past Medical History:   Diagnosis Date    Hypertension     Hypothyroidism     Type I diabetes mellitus (HCC)        Current Outpatient Prescriptions   Medication Sig    amLODIPine (NORVASC) 5 mg tablet Take 1 Tab by mouth daily.  levothyroxine (SYNTHROID) 150 mcg tablet TAKE 1 TAB BY MOUTH DAILY.  irbesartan-hydroCHLOROthiazide (AVALIDE) 150-12.5 mg per tablet TAKE 2 TABS BY MOUTH DAILY.  LANTUS SOLOSTAR 100 unit/mL (3 mL) inpn INJECT 19 UNITS SUBCUTANEOUSLY DAILY    glucose blood VI test strips (ONETOUCH VERIO) strip Monitor blood sugar 6 times daily    HUMALOG KWIKPEN 100 unit/mL kwikpen INJECT 6 TO 12 UNITS SUBCUTANEOUSLY 3 TIMES A DAY BEFORE MEALS AS DIRECTED  Indications: type 1 diabetes mellitus    Insulin Needles, Disposable, 31 gauge x 5/16\" ndle Use as directed with insulin pen 4 times daily    Lancets misc Monitor blood sugar 6 times daily     No current facility-administered medications for this visit. Medication compliance per rooming staff. Subjective:   Diabetes Self-care Practices  Healthy Eating-Consumes low carbohydrate meals   Being Active- Reports cardiovascular workout on exercise equipment, weightlifting, and yoga regularly. Monitoring-She is testing blood glucoses using Contour Next One meter system. Taking Medications-She is taking prescribed diabetes medications.     Objective:   Alert, oriented and in no acute distress     Visit Vitals    /66 (BP 1 Location: Right arm, BP Patient Position: Sitting)    Pulse 80    Resp 12    Ht 5' 4\" (1.626 m)    Wt 121 lb 6.4 oz (55.1 kg)    BMI 20.84 kg/m2        Lab Results   Component Value Date    NBF6GGVM 7.5 01/26/2018    SPT5EYFU 8.0 09/29/2017    DAY3OOXO 7.8 05/26/2017    GFRAA 48 (L) 05/26/2017    GFRNA 42 (L) 05/26/2017    TSH 0.104 (L) 05/26/2017       Diabetes Self-care Practices  Problemsolving-Participated in constructing diabetes strategy using shared decision making. Patient does address home management of hypoglycemia, hyperglycemia, and sick day management appropriately. Healthy Coping-She is not anxious or depressed. Feels stable. Reducing Risks- Not on ASA or statin therapy. On BP medications. Health Maintenance   Topic Date Due    Hepatitis C Screening  1945    LIPID PANEL Q1  1945    MICROALBUMIN Q1  09/08/1957    DTaP/Tdap/Td series (1 - Tdap) 09/08/1968    BREAST CANCER SCRN MAMMOGRAM  09/08/1997    FOBT Q 1 YEAR AGE 50-75  09/08/1997    ZOSTER VACCINE AGE 60>  07/08/2007    Bone Densitometry (Dexa) Screening  09/08/2012    Pneumococcal 65+ Low/Medium Risk (1 of 2 - PCV13) 09/08/2012    Influenza Age 9 to Adult  08/01/2017    FOOT EXAM Q1  01/30/2018    MEDICARE YEARLY EXAM  03/14/2018    HEMOGLOBIN A1C Q6M  07/26/2018    EYE EXAM RETINAL OR DILATED Q1  02/06/2019    GLAUCOMA SCREENING Q2Y  02/06/2020      Barriers to diabetes self-care-NONE    Nursing Diagnosis:   Readiness for Enhanced Health Management     Nursing Interventions: Instructed in use of DexCom CGMS including procedure for insertion of sensor and monitoring of data per owner's manual.    Evaluation:   Patient is ready, willing, and agrees to regular use of DexCom CGMS to assist in improving blood glucose control. Plan:   1. No change in diabetes medications per Vini Reagan MD  2. Patient will calibrate device in two (2) hours per manual with two BG values  3. Patient will calibrate every twelve hours until sensor life lapses in seven (7) days  4. Patient will intervene when blood glucoses and alerts necessitate action, I.e BG < 70 and/or >200  5. RTC for scheduled visit with Vini Reagan MD   6.  Patient will initiate data share through [employer] software Armen Lawrence DNP RN, ACNS-BC, BC-ADM, CDE  Adult Health Clinical Nurse Specialist-Diabetes & Endocrine  Phone: [phone]  Fax:  [phone]

## 2018-03-21 NOTE — PROGRESS NOTES
Blade Irwin is a 81 y.o. female      Chief Complaint   Patient presents with    Other     Dexcom Meter Insturction       1. Have you been to the ER, urgent care clinic since your last visit? Hospitalized since your last visit? No    2. Have you seen or consulted any other health care providers outside of the [address] [place] [address] since your last visit? Include any pap smears or colon screening.  No

## 2018-04-03 ENCOUNTER — TELEPHONE (OUTPATIENT)
Dept: ENDOCRINOLOGY | Age: 73
End: 2018-04-03

## 2018-04-03 RX ORDER — INSULIN LISPRO 100 [IU]/ML
INJECTION, SOLUTION INTRAVENOUS; SUBCUTANEOUS
Qty: 30 ML | Refills: 3 | Status: SHIPPED | OUTPATIENT
Start: 2018-04-03 | End: 2018-09-18 | Stop reason: SDUPTHER

## 2018-04-03 NOTE — TELEPHONE ENCOUNTER
----- Message from Jozef Worthington sent at 4/3/2018  3:33 PM EDT -----  Regarding: Dr Sacha Johnson  Pt stated she needs a Rx refill \"Humalog Quick Pen\" sent to the pharmacy on file ([facility]).     Contact [phone]

## 2018-04-24 DIAGNOSIS — E10.9 TYPE 1 DIABETES MELLITUS WITHOUT COMPLICATION (HCC): ICD-10-CM

## 2018-04-24 RX ORDER — AMLODIPINE BESYLATE 5 MG/1
5 TABLET ORAL DAILY
Qty: 90 TAB | Refills: 3 | Status: SHIPPED | OUTPATIENT
Start: 2018-04-24 | End: 2018-05-29 | Stop reason: DRUGHIGH

## 2018-05-25 ENCOUNTER — OFFICE VISIT (OUTPATIENT)
Dept: ENDOCRINOLOGY | Age: 73
End: 2018-05-25

## 2018-05-25 VITALS
BODY MASS INDEX: 21.07 KG/M2 | DIASTOLIC BLOOD PRESSURE: 77 MMHG | SYSTOLIC BLOOD PRESSURE: 190 MMHG | HEART RATE: 82 BPM | HEIGHT: 64 IN | WEIGHT: 123.4 LBS

## 2018-05-25 DIAGNOSIS — E10.9 TYPE 1 DIABETES MELLITUS WITHOUT COMPLICATION (HCC): Primary | ICD-10-CM

## 2018-05-25 LAB — HBA1C MFR BLD HPLC: 7.3 %

## 2018-05-25 RX ORDER — PREDNISOLONE SODIUM PHOSPHATE 10 MG/ML
SOLUTION/ DROPS OPHTHALMIC
Refills: 6 | COMMUNITY
Start: 2018-05-15 | End: 2018-05-29 | Stop reason: DRUGHIGH

## 2018-05-25 RX ORDER — NEPAFENAC 3 MG/ML
SUSPENSION OPHTHALMIC
Refills: 3 | COMMUNITY
Start: 2018-05-15 | End: 2018-05-29 | Stop reason: DRUGHIGH

## 2018-05-25 NOTE — PROGRESS NOTES
Eileen Slade is a 81 y.o. female    Chief Complaint   Patient presents with    Follow-up    Diabetes    Diabetic Foot Exam     due    Other     microalbumin due       1. Have you been to the ER, urgent care clinic since your last visit? Hospitalized since your last visit? No    2. Have you seen or consulted any other health care providers outside of the [address] [place] [address] since your last visit? Include any pap smears or colon screening.  No

## 2018-05-25 NOTE — PROGRESS NOTES
Chief Complaint   Patient presents with    Follow-up    Diabetes    Diabetic Foot Exam     due    Other     microalbumin due       HPI  The patient was seen by myself and Mil Archibald DNP, CNS    Ms. Soheila Kim returns for follow-up of her type 1 diabetes mellitus ×52 years with excellent glucose control and hypertension. Regarding her diabetes, she continues to do very well on Lantus insulin 14 units and Humalog insulin 4 units with meals. Her A1c today is 7.3%. She continues to eat a reasonable diet. She is going to the gym 5 days a week for an hour and a half. She does a half hour  treadmill and an hour of weights. She recently was instructed in the use of a Dex com continuous glucose monitoring system and is very happy with it. Unfortunately someone stole the  and so she just got a new one. The blood sugars on the new  are excellent. She received 2 steroid injections in the left eye. She apparently had cataract surgery which did not go well and is now being followed by Dr. Hemal Scott for this. Regarding her blood pressure, she can turn continues to take Avalide 150/12.5 she takes 2 tablets daily. I added amlodipine 5 mg. She said that this did improve her blood pressure but she was concerned about edema associated with the medication. She stopped it for 2 days and she did see that the edema got better but she started back at my request.  ROS  She denies chest pain, shortness of breath, constipation or diarrhea  History reviewed. No pertinent family history.      Social History     Social History    Marital status: UNKNOWN     Spouse name: N/A    Number of children: N/A    Years of education: N/A     Social History Main Topics    Smoking status: Never Smoker    Smokeless tobacco: Never Used    Alcohol use No    Drug use: No    Sexual activity: Not Asked     Other Topics Concern    None     Social History Narrative        Vitals:    05/25/18 1000   BP: 190/77   Pulse: 82 Weight: 123 lb 6.4 oz (56 kg)   Height: 5' 4\" (1.626 m)   PainSc:   0 - No pain        Physical Examination: General appearance - alert, well appearing, and in no distress  Mental status - alert, oriented to person, place, and time  Neck - supple, no significant adenopathy  Chest - clear to auscultation, no wheezes, rales or rhonchi, symmetric air entry  Heart - normal rate, regular rhythm, normal S1, S2, no murmurs, rubs, clicks or gallops  Abdomen - soft, nontender, nondistended, no masses or organomegaly  Extremities - peripheral pulses normal, no pedal edema, no clubbing or cyanosis    Diabetic foot exam:     Left: Reflexes 4+     Vibratory sensation normal    Filament test normal sensation with micro filament   Pulse DP: 2+ (normal)   Pulse PT: 2+ (normal)   Deformities: None  Right: Reflexes 4+   Vibratory sensation normal   Filament test normal sensation with micro filament   Pulse DP: 2+ (normal)   Pulse PT: 2+ (normal)   Deformities: None      ASSESSMENT & PLAN  Problem #1. Type 1 diabetes mellitus currently on basal bolus insulin therapy. This is doing very well on basal bolus insulin of 14 units and 4 units with meals. With the Dex com she is not having the hypoglycemia because she can correct if needed so this is doing very well and no changes were made. Problem #2. Hypertension. Ms. Gricelda Nobles his blood pressure has been erratic for many years. The Avalide has been a reasonable drug for her for many years but she now has blood pressure 190/77 on Avalide plus amlodipine. An option for her would be the addition of spironolactone but we will need to check her potassium to make sure this is an option that is viable. If not, a total reassessment of her blood pressure regimen will need to be made. Problem #3 diabetic retinopathy. She is now under the care of both Dr. Mckay Barnes and Dr. Kenneth Hurst.     We spent more than 25 mintutes face to face, more than 50% was in counseling regarding diet, exercise and carbohydrate intake.

## 2018-05-26 LAB
ALBUMIN SERPL-MCNC: 4.1 G/DL (ref 3.5–4.8)
ALBUMIN/GLOB SERPL: 1.6 {RATIO} (ref 1.2–2.2)
ALP SERPL-CCNC: 68 IU/L (ref 39–117)
ALT SERPL-CCNC: 69 IU/L (ref 0–32)
AST SERPL-CCNC: 41 IU/L (ref 0–40)
BILIRUB SERPL-MCNC: <0.2 MG/DL (ref 0–1.2)
BUN SERPL-MCNC: 65 MG/DL (ref 8–27)
BUN/CREAT SERPL: 13 (ref 12–28)
CALCIUM SERPL-MCNC: 8.8 MG/DL (ref 8.7–10.3)
CHLORIDE SERPL-SCNC: 97 MMOL/L (ref 96–106)
CHOLEST SERPL-MCNC: 259 MG/DL (ref 100–199)
CO2 SERPL-SCNC: 19 MMOL/L (ref 18–29)
CREAT SERPL-MCNC: 5.04 MG/DL (ref 0.57–1)
GFR SERPLBLD CREATININE-BSD FMLA CKD-EPI: 8 ML/MIN/1.73
GFR SERPLBLD CREATININE-BSD FMLA CKD-EPI: 9 ML/MIN/1.73
GLOBULIN SER CALC-MCNC: 2.6 G/DL (ref 1.5–4.5)
GLUCOSE SERPL-MCNC: 112 MG/DL (ref 65–99)
HDLC SERPL-MCNC: 77 MG/DL
INTERPRETATION, 910389: NORMAL
INTERPRETATION: NORMAL
LDLC SERPL CALC-MCNC: 152 MG/DL (ref 0–99)
PDF IMAGE, 910387: NORMAL
POTASSIUM SERPL-SCNC: 5 MMOL/L (ref 3.5–5.2)
PROT SERPL-MCNC: 6.7 G/DL (ref 6–8.5)
SODIUM SERPL-SCNC: 136 MMOL/L (ref 134–144)
TRIGL SERPL-MCNC: 149 MG/DL (ref 0–149)
VLDLC SERPL CALC-MCNC: 30 MG/DL (ref 5–40)

## 2018-05-28 NOTE — PROGRESS NOTES
I called Ms Gricelda Nobles to discuss her recent lab data with marked increase in serum creatinine. I advised her that I will make an appointment for her to see a nephrologist ASAP.

## 2018-05-29 ENCOUNTER — HOSPITAL ENCOUNTER (INPATIENT)
Age: 73
LOS: 4 days | Discharge: HOME | DRG: 683 | End: 2018-06-02
Attending: EMERGENCY MEDICINE | Admitting: INTERNAL MEDICINE
Payer: MEDICARE

## 2018-05-29 ENCOUNTER — APPOINTMENT (OUTPATIENT)
Dept: ULTRASOUND IMAGING | Age: 73
DRG: 683 | End: 2018-05-29
Attending: INTERNAL MEDICINE
Payer: MEDICARE

## 2018-05-29 DIAGNOSIS — E87.1 ACUTE HYPONATREMIA: ICD-10-CM

## 2018-05-29 DIAGNOSIS — I10 ACCELERATED HYPERTENSION: ICD-10-CM

## 2018-05-29 DIAGNOSIS — R53.1 GENERALIZED WEAKNESS: Primary | ICD-10-CM

## 2018-05-29 DIAGNOSIS — R79.89 ELEVATED LFTS: ICD-10-CM

## 2018-05-29 DIAGNOSIS — D64.9 ANEMIA, UNSPECIFIED TYPE: ICD-10-CM

## 2018-05-29 DIAGNOSIS — R63.4 WEIGHT LOSS: ICD-10-CM

## 2018-05-29 DIAGNOSIS — N18.30 CKD (CHRONIC KIDNEY DISEASE) STAGE 3, GFR 30-59 ML/MIN (HCC): ICD-10-CM

## 2018-05-29 DIAGNOSIS — N17.9 ACUTE KIDNEY INJURY (HCC): ICD-10-CM

## 2018-05-29 PROBLEM — E11.9 DM (DIABETES MELLITUS) (HCC): Status: ACTIVE | Noted: 2018-05-29

## 2018-05-29 LAB
ALBUMIN SERPL-MCNC: 3.6 G/DL (ref 3.5–5)
ALBUMIN/GLOB SERPL: 1 {RATIO} (ref 1.1–2.2)
ALP SERPL-CCNC: 72 U/L (ref 45–117)
ALT SERPL-CCNC: 70 U/L (ref 12–78)
ANION GAP SERPL CALC-SCNC: 13 MMOL/L (ref 5–15)
APPEARANCE UR: CLEAR
AST SERPL-CCNC: 41 U/L (ref 15–37)
BACTERIA URNS QL MICRO: NEGATIVE /HPF
BASOPHILS # BLD: 0 K/UL (ref 0–0.1)
BASOPHILS NFR BLD: 0 % (ref 0–1)
BILIRUB SERPL-MCNC: 0.5 MG/DL (ref 0.2–1)
BILIRUB UR QL: NEGATIVE
BUN SERPL-MCNC: 76 MG/DL (ref 6–20)
BUN/CREAT SERPL: 15 (ref 12–20)
CALCIUM SERPL-MCNC: 9.3 MG/DL (ref 8.5–10.1)
CHLORIDE SERPL-SCNC: 91 MMOL/L (ref 97–108)
CO2 SERPL-SCNC: 22 MMOL/L (ref 21–32)
COLOR UR: ABNORMAL
CREAT SERPL-MCNC: 5.13 MG/DL (ref 0.55–1.02)
CREAT UR-MCNC: 28.5 MG/DL
CREAT UR-MCNC: 29.9 MG/DL
DIFFERENTIAL METHOD BLD: ABNORMAL
EOSINOPHIL # BLD: 0.1 K/UL (ref 0–0.4)
EOSINOPHIL NFR BLD: 1 % (ref 0–7)
EPITH CASTS URNS QL MICRO: ABNORMAL /LPF
ERYTHROCYTE [DISTWIDTH] IN BLOOD BY AUTOMATED COUNT: 12.5 % (ref 11.5–14.5)
GLOBULIN SER CALC-MCNC: 3.7 G/DL (ref 2–4)
GLUCOSE BLD STRIP.AUTO-MCNC: 92 MG/DL (ref 65–100)
GLUCOSE SERPL-MCNC: 94 MG/DL (ref 65–100)
GLUCOSE UR STRIP.AUTO-MCNC: NEGATIVE MG/DL
HCT VFR BLD AUTO: 25.4 % (ref 35–47)
HGB BLD-MCNC: 8.5 G/DL (ref 11.5–16)
HGB UR QL STRIP: ABNORMAL
IMM GRANULOCYTES # BLD: 0 K/UL (ref 0–0.04)
IMM GRANULOCYTES NFR BLD AUTO: 0 % (ref 0–0.5)
KETONES UR QL STRIP.AUTO: NEGATIVE MG/DL
LEUKOCYTE ESTERASE UR QL STRIP.AUTO: NEGATIVE
LYMPHOCYTES # BLD: 2 K/UL (ref 0.8–3.5)
LYMPHOCYTES NFR BLD: 20 % (ref 12–49)
MAGNESIUM SERPL-MCNC: 2.3 MG/DL (ref 1.6–2.4)
MCH RBC QN AUTO: 31.5 PG (ref 26–34)
MCHC RBC AUTO-ENTMCNC: 33.5 G/DL (ref 30–36.5)
MCV RBC AUTO: 94.1 FL (ref 80–99)
MICROALBUMIN UR-MCNC: 96.1 MG/DL
MICROALBUMIN/CREAT UR-RTO: 3214 MG/G (ref 0–30)
MONOCYTES # BLD: 1 K/UL (ref 0–1)
MONOCYTES NFR BLD: 10 % (ref 5–13)
NEUTS SEG # BLD: 7 K/UL (ref 1.8–8)
NEUTS SEG NFR BLD: 69 % (ref 32–75)
NITRITE UR QL STRIP.AUTO: NEGATIVE
NRBC # BLD: 0 K/UL (ref 0–0.01)
NRBC BLD-RTO: 0 PER 100 WBC
PH UR STRIP: 7 [PH] (ref 5–8)
PHOSPHATE SERPL-MCNC: 5.1 MG/DL (ref 2.6–4.7)
PLATELET # BLD AUTO: 246 K/UL (ref 150–400)
PMV BLD AUTO: 12.1 FL (ref 8.9–12.9)
POTASSIUM SERPL-SCNC: 4.5 MMOL/L (ref 3.5–5.1)
PROT SERPL-MCNC: 7.3 G/DL (ref 6.4–8.2)
PROT UR STRIP-MCNC: 100 MG/DL
RBC # BLD AUTO: 2.7 M/UL (ref 3.8–5.2)
RBC #/AREA URNS HPF: ABNORMAL /HPF (ref 0–5)
SERVICE CMNT-IMP: NORMAL
SODIUM SERPL-SCNC: 126 MMOL/L (ref 136–145)
SODIUM UR-SCNC: 31 MMOL/L
SP GR UR REFRACTOMETRY: 1.01 (ref 1–1.03)
UA: UC IF INDICATED,UAUC: ABNORMAL
UROBILINOGEN UR QL STRIP.AUTO: 0.2 EU/DL (ref 0.2–1)
WBC # BLD AUTO: 10.1 K/UL (ref 3.6–11)
WBC URNS QL MICRO: ABNORMAL /HPF (ref 0–4)

## 2018-05-29 PROCEDURE — 85025 COMPLETE CBC W/AUTO DIFF WBC: CPT | Performed by: PHYSICIAN ASSISTANT

## 2018-05-29 PROCEDURE — 76700 US EXAM ABDOM COMPLETE: CPT

## 2018-05-29 PROCEDURE — 86038 ANTINUCLEAR ANTIBODIES: CPT | Performed by: INTERNAL MEDICINE

## 2018-05-29 PROCEDURE — 82962 GLUCOSE BLOOD TEST: CPT

## 2018-05-29 PROCEDURE — 83735 ASSAY OF MAGNESIUM: CPT | Performed by: PHYSICIAN ASSISTANT

## 2018-05-29 PROCEDURE — 74011250637 HC RX REV CODE- 250/637: Performed by: INTERNAL MEDICINE

## 2018-05-29 PROCEDURE — 86256 FLUORESCENT ANTIBODY TITER: CPT | Performed by: INTERNAL MEDICINE

## 2018-05-29 PROCEDURE — 87340 HEPATITIS B SURFACE AG IA: CPT | Performed by: INTERNAL MEDICINE

## 2018-05-29 PROCEDURE — 81001 URINALYSIS AUTO W/SCOPE: CPT | Performed by: PHYSICIAN ASSISTANT

## 2018-05-29 PROCEDURE — 86706 HEP B SURFACE ANTIBODY: CPT | Performed by: INTERNAL MEDICINE

## 2018-05-29 PROCEDURE — 36415 COLL VENOUS BLD VENIPUNCTURE: CPT | Performed by: PHYSICIAN ASSISTANT

## 2018-05-29 PROCEDURE — 84300 ASSAY OF URINE SODIUM: CPT | Performed by: INTERNAL MEDICINE

## 2018-05-29 PROCEDURE — 86803 HEPATITIS C AB TEST: CPT | Performed by: INTERNAL MEDICINE

## 2018-05-29 PROCEDURE — 65660000000 HC RM CCU STEPDOWN

## 2018-05-29 PROCEDURE — 82784 ASSAY IGA/IGD/IGG/IGM EACH: CPT | Performed by: INTERNAL MEDICINE

## 2018-05-29 PROCEDURE — 82570 ASSAY OF URINE CREATININE: CPT | Performed by: INTERNAL MEDICINE

## 2018-05-29 PROCEDURE — 84100 ASSAY OF PHOSPHORUS: CPT | Performed by: PHYSICIAN ASSISTANT

## 2018-05-29 PROCEDURE — 83883 ASSAY NEPHELOMETRY NOT SPEC: CPT | Performed by: INTERNAL MEDICINE

## 2018-05-29 PROCEDURE — 99284 EMERGENCY DEPT VISIT MOD MDM: CPT

## 2018-05-29 PROCEDURE — 82043 UR ALBUMIN QUANTITATIVE: CPT | Performed by: INTERNAL MEDICINE

## 2018-05-29 PROCEDURE — 80053 COMPREHEN METABOLIC PANEL: CPT | Performed by: PHYSICIAN ASSISTANT

## 2018-05-29 RX ORDER — DEXTROSE 50 % IN WATER (D50W) INTRAVENOUS SYRINGE
12.5-25 AS NEEDED
Status: DISCONTINUED | OUTPATIENT
Start: 2018-05-29 | End: 2018-06-02 | Stop reason: HOSPADM

## 2018-05-29 RX ORDER — INSULIN GLARGINE 100 [IU]/ML
16 INJECTION, SOLUTION SUBCUTANEOUS DAILY
COMMUNITY

## 2018-05-29 RX ORDER — AMLODIPINE BESYLATE 5 MG/1
5 TABLET ORAL
COMMUNITY
End: 2018-01-01 | Stop reason: ALTCHOICE

## 2018-05-29 RX ORDER — LEVOTHYROXINE SODIUM 150 UG/1
150 TABLET ORAL
Status: DISCONTINUED | OUTPATIENT
Start: 2018-05-29 | End: 2018-06-02 | Stop reason: HOSPADM

## 2018-05-29 RX ORDER — HEPARIN SODIUM 5000 [USP'U]/ML
5000 INJECTION, SOLUTION INTRAVENOUS; SUBCUTANEOUS EVERY 8 HOURS
Status: DISCONTINUED | OUTPATIENT
Start: 2018-05-29 | End: 2018-05-29

## 2018-05-29 RX ORDER — IRBESARTAN AND HYDROCHLOROTHIAZIDE 150; 12.5 MG/1; MG/1
2 TABLET, FILM COATED ORAL
COMMUNITY
End: 2018-06-02

## 2018-05-29 RX ORDER — SODIUM CHLORIDE 0.9 % (FLUSH) 0.9 %
5-10 SYRINGE (ML) INJECTION EVERY 8 HOURS
Status: DISCONTINUED | OUTPATIENT
Start: 2018-05-29 | End: 2018-06-02 | Stop reason: HOSPADM

## 2018-05-29 RX ORDER — INSULIN GLARGINE 100 [IU]/ML
14 INJECTION, SOLUTION SUBCUTANEOUS DAILY
Status: DISCONTINUED | OUTPATIENT
Start: 2018-05-30 | End: 2018-05-31

## 2018-05-29 RX ORDER — LEVOTHYROXINE SODIUM 150 UG/1
150 TABLET ORAL
COMMUNITY

## 2018-05-29 RX ORDER — PREDNISOLONE SODIUM PHOSPHATE 10 MG/ML
1 SOLUTION/ DROPS OPHTHALMIC DAILY
COMMUNITY

## 2018-05-29 RX ORDER — AMLODIPINE BESYLATE 5 MG/1
5 TABLET ORAL
Status: DISCONTINUED | OUTPATIENT
Start: 2018-05-29 | End: 2018-06-01

## 2018-05-29 RX ORDER — MAGNESIUM SULFATE 100 %
4 CRYSTALS MISCELLANEOUS AS NEEDED
Status: DISCONTINUED | OUTPATIENT
Start: 2018-05-29 | End: 2018-06-02 | Stop reason: HOSPADM

## 2018-05-29 RX ORDER — HEPARIN SODIUM 5000 [USP'U]/ML
5000 INJECTION, SOLUTION INTRAVENOUS; SUBCUTANEOUS EVERY 8 HOURS
Status: DISCONTINUED | OUTPATIENT
Start: 2018-05-30 | End: 2018-05-31

## 2018-05-29 RX ORDER — IBUPROFEN 200 MG
400 TABLET ORAL
COMMUNITY
End: 2018-06-02

## 2018-05-29 RX ORDER — HYDRALAZINE HYDROCHLORIDE 20 MG/ML
10 INJECTION INTRAMUSCULAR; INTRAVENOUS
Status: DISCONTINUED | OUTPATIENT
Start: 2018-05-29 | End: 2018-06-02 | Stop reason: HOSPADM

## 2018-05-29 RX ORDER — SODIUM CHLORIDE 0.9 % (FLUSH) 0.9 %
5-10 SYRINGE (ML) INJECTION AS NEEDED
Status: DISCONTINUED | OUTPATIENT
Start: 2018-05-29 | End: 2018-06-02 | Stop reason: HOSPADM

## 2018-05-29 RX ORDER — INSULIN LISPRO 100 [IU]/ML
INJECTION, SOLUTION INTRAVENOUS; SUBCUTANEOUS
Status: DISCONTINUED | OUTPATIENT
Start: 2018-05-29 | End: 2018-05-31

## 2018-05-29 RX ADMIN — AMLODIPINE BESYLATE 5 MG: 5 TABLET ORAL at 20:22

## 2018-05-29 RX ADMIN — Medication 10 ML: at 20:23

## 2018-05-29 RX ADMIN — LEVOTHYROXINE SODIUM 150 MCG: 150 TABLET ORAL at 23:10

## 2018-05-29 NOTE — CONSULTS
Consultation Note    NAME: Oscar Bautista   :  1945   MRN:  [id number]     Date/Time:  2018 7:43 PM    I have been asked to see this patient by Dr. Isai Cordova  for advice/opinion re: CKD-3/MELISSA. Assessment :    Plan:  CKD-3  MELISSA  Anemia  Hyperphosphatemia  Increased LFT\"s   Abdominal discomfort  Weight loss  Hyponatremia  DM - type I  NSAID use   Her creatinine a year ago was 1.3 putting her in CKD stage 3 which is remarkable given that she has had DM for 50 + years. I would heave expected a worse creatinine. Her creatinine is now over 5. Either an acute or subacute worsening. I suspect subacute. She has a limited NSAID history. I'll check an abdominal U/S given her increased LFT's and creatinine. Her recent weight loss and abdominal discomfort is concerning. Her sodium is low as she was told to drink plenty of fluids and this has diluted her sodium with her renal disease and inability to excrete free water. She has mild edema. She needs no IVF and I asked her not to go overboard on her fluid intake. I will hold her ARB and diuretic as they may be contributing as well. Her urine has blood and protein. C/W DM nephropathy but I'll send serologies as well. She is anemic. I'll check myeloma labs and also her iron status. Subjective:   CHIEF COMPLAINT:  \"I was told to come here. \"    HISTORY OF PRESENT ILLNESS:     Fuentes is a 81 y.o.   female who has a history of DM I admitted with MELISSA. She sees Dr. Catalina Renteria for her DM. Labs from last year showed a creatinine of 1.3. She says that she did the \"Whole 30\" diet last fall and lost weight . She says that she is fastidious about checking her glucose. She says that over the past several months her stomach \"has been off. \"  She doesn't complain of abdominal pain just that her stomach \"is not quite right. \"  She says that she has lost additional weight over the past several months. Her po intake has declined. No N/V.   She uses an NSAID 2-3 times a month. She has had no hematuria. She saw Dr. Barbara Lopez last week and labs showed a creatinine of 5.04 and a normal sodium. He recommended that she come to the Er tonight and her creatinine was 5. 13. Past Medical History:   Diagnosis Date    Hypertension     Hypothyroidism     Type I diabetes mellitus ([address]       Past Surgical History:   Procedure Laterality Date    COLONOSCOPY N/A 10/2/2017    COLONOSCOPY performed by Araceli Delcid. Mckenzie Sykes MD at [facility] END[facility]COPY    HX APPENDECTOMY      HX  SECTION      HX TONSILLECTOMY      HX TUBAL LIGATION       Social History   Substance Use Topics    Smoking status: Never Smoker    Smokeless tobacco: Never Used    Alcohol use No      History reviewed. No pertinent family history. Allergies   Allergen Reactions    Penicillins Unknown (comments)      Prior to Admission medications    Medication Sig Start Date End Date Taking? Authorizing Provider   amLODIPine (NORVASC) 5 mg tablet Take 5 mg by mouth nightly. Yes Historical Provider   nepafenac (ILEVRO) 0.3 % drps Administer 1 Drop to left eye daily. Yes Historical Provider   irbesartan-hydroCHLOROthiazide (AVALIDE) 150-12.5 mg per tablet Take 2 Tabs by mouth nightly. Yes Historical Provider   prednisoLONE sodium phosphate (INFLAMASE FORTE) 1 % ophthalmic solution Administer 1 Drop to left eye daily. Yes Historical Provider   levothyroxine (SYNTHROID) 150 mcg tablet Take 150 mcg by mouth nightly. Yes Historical Provider   vit C/E/Zn//lut/jennifer/bio/saf (RETAINE VISION PO) Administer 1 Drop to both eyes daily as needed ('Dry Eyes'). Yes Historical Provider   ibuprofen (ADVIL) 200 mg tablet Take 400 mg by mouth daily as needed ('Headache'). Yes Historical Provider   MAGNESIUM PO Take 1 Tab by mouth daily as needed ('Leg Cramping').    Yes Historical Provider   HUMALOG KWIKPEN INSULIN 100 unit/mL kwikpen INJECT 6 TO 12 UNITS SUBCUTANEOUSLY 3 TIMES A DAY BEFORE MEALS AS DIRECTED Indications: type 1 diabetes mellitus 4/3/18  Yes Vini Reagan MD   LANTUS SOLOSTAR 100 unit/mL (3 mL) inpn INJECT 19 UNITS SUBCUTANEOUSLY DAILY 11/29/17  Yes Vini Reagan MD     REVIEW OF SYSTEMS:     []  Unable to obtain reliable ROS due to  [] mental status  [] sedated   [] intubated   [x] Total of 12 systems reviewed as follows:  Constitutional: negative fever, negative chills, pos weight loss  Eyes:   negative visual changes  ENT:   negative sore throat, tongue or lip swelling  Respiratory:  negative cough, negative dyspnea  Cards:  negative for chest pain, palpitations, pos mild lower extremity edema  GI:   negative for nausea, vomiting, diarrhea, and abdominal pain - but stomach \"not quite right. \"  :  negative for frequency, dysuria  Integument:  negative for rash and pruritus  Heme:  negative for easy bruising and gum/nose bleeding  Musculoskel: negative for myalgias,  back pain and muscle weakness  Neuro:  negative for headaches, dizziness, vertigo  Psych:  negative for feelings of anxiety, depression   Travel?: none    Objective:   VITALS:    Visit Vitals    /63    Pulse 70    Temp 98.1 °F (36.7 °C)    Resp 14    Ht 5' 4\" (1.626 m)    Wt 58.1 kg (128 lb 1.4 oz)    SpO2 98%    BMI 21.99 kg/m2     PHYSICAL EXAM:  Gen:  [x]  WD [x]  WN  [] cachectic []  thin []  obese []  disheveled             []  ill apearing  []   Critical  []   Chronic    [x]  No acute distress    HEENT:   [x] NC/AT/PERRL    [] pink conjunctivae      [] pale conjunctivae                  PERRL  [] yes  [] no      [] moist mucosa    [] dry mucosa    hearing intact to voice [] yes  [] No                 NECK:   supple [x] yes  [] no        masses [] yes  [x] No               thyroid  []  non tender  []  tender    RESP:   [x] CTA bilaterally/no wheezing/rhonchi/rales/crackles    [] rhonchi bilaterally - no dullness  [] wheezing   [] rhonchi   [] crackles     use of accessory muscles [] yes [] no    CARD:   [x]  regular rate and rhythm/No murmurs/rubs/gallops    murmur  [] yes ()  [] no      Rubs  [] yes  [] no       Gallops [] yes  [] no    Rate []  regular  []  irregular        carotid bruits  [] Right  []  Left                 LE edema [x] yes - very mild in ankles  [] no           JVP  []  yes   []  no    ABD:    [x] soft/non distended/non tender/+bowel sounds/no HSM    []  Rigid    tenderness [] yes [] no   Liver enlargement  []  yes []  no                Spleen enlargement  []  yes []  no     distended []  yes [] no     bowel sound  [] hypoactive   [] hyperactive    LYMPH:    Neck []  yes [x]  no       Axillae []  yes [x]  no    SKIN:   Rashes []  yes   [x]  no    Ulcers []  yes   [x]  no               [] tight to palpitation    skin turgor []  good  [] poor  [] decreased               Cyanosis/clubbing []  yes []  no    NEUR:   [x] cranial nerves II-XII grossly intact       [] Cranial nerves deficit                 []  facial droop    []  slurred speech   [] aphasic     [] Strength normal     []  weakness  []  LUE  []   RUE/ []  LLE  []   RLE    follows commands  [x]  yes []  no           PSYCH:   insight [] poor [x] good   Alert and Oriented to  [x] person  [x] place  [x]  time                    [] depressed [] anxious [] agitated  [] lethargic [] stuporous  [] sedated     LAB DATA REVIEWED:    Recent Labs      05/29/18   1238   WBC  10.1   HGB  8.5*   HCT  25.4*   PLT  246     Recent Labs      05/29/18   1238   NA  126*   K  4.5   CL  91*   CO2  22   BUN  76*   CREA  5.13*   GLU  94   CA  9.3   MG  2.3   PHOS  5.1*     Recent Labs      05/29/18   1238   SGOT  41*   ALT  70   AP  72   TBILI  0.5   ALB  3.6   GLOB  3.7     No results for input(s): INR, PTP, APTT in the last 72 hours. No lab exists for component: INREXT   No results for input(s): FE, TIBC, PSAT, FERR in the last 72 hours. No results for input(s): PH, PCO2, PO2 in the last 72 hours. No results for input(s): CPK, CKMB in the last 72 hours.     No lab exists for component: TROPONINI  Lab Results   Component Value Date/Time    Glucose (POC) 100 10/02/2017 08:55 AM       Procedures: see electronic medical records for all procedures/Xrays and details which were not copied into this note but were reviewed prior to creation of Plan.    ________________________________________________________________________       ___________________________________________________  Consulting Physician: Trey Cottrell MD

## 2018-05-29 NOTE — ED PROVIDER NOTES
EMERGENCY DEPARTMENT HISTORY AND PHYSICAL EXAM      PROVIDER IN TRIAGE NOTE:  12:23 PM  DAMARIS Rodriguez has evaluated the patient as the Provider in Triage (PIT) for elevated kidney function, denies dysuria or frequency. The vital signs and the triage nurse assessment have been reviewed. The patient and any available family have been advised that the appropriate studies have been ordered to initiate the work up based on the clinical presentation during the assessment. The pt has been advised that they will be accommodated in main ED as soon as possible. The pt has been requested to contact the triage nurse or PIT immediately if they experiences any changes in their condition during this brief waiting period. Written by Justin Ramsay, ED Scribe, as dictated by Jori Nyhan.    Progress Note:  3:09 PM  Pt's creatinine is elevated to 5.13 from ~1.0. Spoke with Hetal Miranda MD regarding the patient's critical lab values, and he is aware. Written by Justin Ramsay, ED Scribe, as dictated by Jori Nyhan.    Date: 5/29/2018  Patient Name: Gerard Cramer    History of Presenting Illness     Chief Complaint   Patient presents with    End Stage Renal Disease     Ambulatory w/ referral from endocrinologist w/ c/o increased kidney function & need for nephrology consult. History Provided By: Patient and Patient's     HPI: Gerard Cramer, 81 y.o. female with PMHx significant for DM1 last A1C 7.3%, HTN, who presents to the ED sent in by her endocrinologist Dr. Holly Jean Baptiste with cc of acute renal failure. Patient has recently added amlodipine onto her anti-hypertensive regimen and was noted to have an elevated creatinine on blood draw late last week. The patient has remained generally asymptomatic noting she feels \"a little different\" but nothing she can specifically identify. States she has had LE swelling but that resolved when she stopped taking her amlodipine on her own for 2-3 days.   Discussion with Dr. Sang Andrea whom spoke to nephrology team here at [facility] to evaluate etiology of acute renal failure. Chief Complaint: Renal Failure  Duration: 5 Days  Timing:  N/A  Location: N/A  Quality: N/A  Severity: N/A  Modifying Factors: N/A  Associated Symptoms: denies any other associated signs or symptoms    There are no other complaints, changes, or physical findings at this time. PCP: Yan Oden MD    Current Facility-Administered Medications   Medication Dose Route Frequency Provider Last Rate Last Dose    amLODIPine (NORVASC) tablet 5 mg  5 mg Oral QHS Cathy Valadez MD   5 mg at 05/29/18 2022    [START ON 5/30/2018] insulin glargine (LANTUS) injection 14 Units  14 Units SubCUTAneous DAILY Thom Malik MD        levothyroxine (SYNTHROID) tablet 150 mcg  150 mcg Oral QHS Cathy Valadez MD        sodium chloride (NS) flush 5-10 mL  5-10 mL IntraVENous Q8H Cathy Valadez MD   10 mL at 05/29/18 2023    sodium chloride (NS) flush 5-10 mL  5-10 mL IntraVENous PRN Cathy Valadez MD        insulin lispro (HUMALOG) injection   SubCUTAneous AC&HS Cathy Valadez MD   Stopped at 05/29/18 2027    glucose chewable tablet 16 g  4 Tab Oral PRN Cathy Valadez MD        dextrose (D50W) injection syrg 12.5-25 g  12.5-25 g IntraVENous PROMID Valadez MD        glucagon [facility]) injection 1 mg  1 mg IntraMUSCular PRN Cathy Valadez MD        hydrALAZINE (APRESOLINE) 20 mg/mL injection 10 mg  10 mg IntraVENous Q6H PROMID Valadez MD        [START ON 5/30/2018] heparin (porcine) injection 5,000 Units  5,000 Units SubCUTAneous Q8H Janette Rodriges MD         Current Outpatient Prescriptions   Medication Sig Dispense Refill    amLODIPine (NORVASC) 5 mg tablet Take 5 mg by mouth nightly.  nepafenac (ILEVRO) 0.3 % drps Administer 1 Drop to left eye daily.       irbesartan-hydroCHLOROthiazide (AVALIDE) 150-12.5 mg per tablet Take 2 Tabs by mouth nightly.  prednisoLONE sodium phosphate (INFLAMASE FORTE) 1 % ophthalmic solution Administer 1 Drop to left eye daily.  levothyroxine (SYNTHROID) 150 mcg tablet Take 150 mcg by mouth nightly.  vit C/E/Zn//lut/jennifer/bio/saf (RETAINE VISION PO) Administer 1 Drop to both eyes daily as needed ('Dry Eyes').  ibuprofen (ADVIL) 200 mg tablet Take 400 mg by mouth daily as needed ('Headache').  MAGNESIUM PO Take 1 Tab by mouth daily as needed ('Leg Cramping').  insulin glargine (LANTUS SOLOSTAR U-100 INSULIN) 100 unit/mL (3 mL) inpn 14 Units by SubCUTAneous route daily.  HUMALOG KWIKPEN INSULIN 100 unit/mL kwikpen INJECT 6 TO 12 UNITS SUBCUTANEOUSLY 3 TIMES A DAY BEFORE MEALS AS DIRECTED  Indications: type 1 diabetes mellitus 30 mL 3       Past History     Past Medical History:  Past Medical History:   Diagnosis Date    Hypertension     Hypothyroidism     Type I diabetes mellitus [address] [address]        Past Surgical History:  Past Surgical History:   Procedure Laterality Date    COLONOSCOPY N/A 10/2/2017    COLONOSCOPY performed by Dennis Needle. Lissette Tucker MD at [facility] ENDOSCOPY    HX APPENDECTOMY      HX  SECTION      HX TONSILLECTOMY      HX TUBAL LIGATION         Family History:  History reviewed. No pertinent family history. Social History:  Social History   Substance Use Topics    Smoking status: Never Smoker    Smokeless tobacco: Never Used    Alcohol use No       Allergies: Allergies   Allergen Reactions    Penicillins Unknown (comments)         Review of Systems   Review of Systems   Constitutional: Negative. Negative for activity change, chills and fever. HENT: Negative. Negative for congestion, facial swelling, rhinorrhea, sore throat, trouble swallowing and voice change. Eyes: Negative. Negative for pain and redness. Respiratory: Negative.   Negative for apnea, cough, chest tightness, shortness of breath and wheezing. Cardiovascular: Negative. Negative for chest pain, palpitations and leg swelling. Gastrointestinal: Negative. Negative for abdominal distention, abdominal pain, blood in stool, constipation, diarrhea, nausea and vomiting. Endocrine: Negative. Negative for cold intolerance, heat intolerance and polyuria. Genitourinary: Negative. Negative for difficulty urinating, dysuria, flank pain, frequency, hematuria and urgency. Musculoskeletal: Negative. Negative for arthralgias, back pain, myalgias, neck pain and neck stiffness. Skin: Negative. Negative for color change and rash. Neurological: Negative. Negative for dizziness, syncope, facial asymmetry, speech difficulty, weakness, light-headedness, numbness and headaches. Hematological: Negative. Does not bruise/bleed easily. Psychiatric/Behavioral: Negative. Negative for confusion and self-injury. The patient is not nervous/anxious. All other systems reviewed and are negative. Physical Exam   Physical Exam   Constitutional: She is oriented to person, place, and time. No distress. Thin, frail appearing female   HENT:   Head: Normocephalic and atraumatic. Nose: Nose normal.   Mouth/Throat: Oropharynx is clear and moist. No oropharyngeal exudate. Eyes: Conjunctivae and EOM are normal. Pupils are equal, round, and reactive to light. No scleral icterus. Neck: Normal range of motion. Neck supple. No JVD present. No tracheal deviation present. No thyromegaly present. Cardiovascular: Normal rate, regular rhythm, normal heart sounds and intact distal pulses. Exam reveals no gallop and no friction rub. No murmur heard. Pulmonary/Chest: Effort normal and breath sounds normal. No stridor. No respiratory distress. She has no wheezes. She has no rales. She exhibits no tenderness. Abdominal: Soft. Bowel sounds are normal. She exhibits no distension and no mass. There is no tenderness. There is no rebound and no guarding. Musculoskeletal: Normal range of motion. She exhibits edema. She exhibits no tenderness or deformity. Lymphadenopathy:     She has no cervical adenopathy. Neurological: She is alert and oriented to person, place, and time. She displays normal reflexes. No cranial nerve deficit. She exhibits normal muscle tone. Coordination normal.   Skin: Skin is warm and dry. No rash noted. She is not diaphoretic. No erythema. Psychiatric: She has a normal mood and affect. Her behavior is normal.   Nursing note and vitals reviewed. Diagnostic Study Results     Labs -     Recent Results (from the past 12 hour(s))   CBC WITH AUTOMATED DIFF    Collection Time: 05/29/18 12:38 PM   Result Value Ref Range    WBC 10.1 3.6 - 11.0 K/uL    RBC 2.70 (L) 3.80 - 5.20 M/uL    HGB 8.5 (L) 11.5 - 16.0 g/dL    HCT 25.4 (L) 35.0 - 47.0 %    MCV 94.1 80.0 - 99.0 FL    MCH 31.5 26.0 - 34.0 PG    MCHC 33.5 30.0 - 36.5 g/dL    RDW 12.5 11.5 - 14.5 %    PLATELET 425 171 - 547 K/uL    MPV 12.1 8.9 - 12.9 FL    NRBC 0.0 0  WBC    ABSOLUTE NRBC 0.00 0.00 - 0.01 K/uL    NEUTROPHILS 69 32 - 75 %    LYMPHOCYTES 20 12 - 49 %    MONOCYTES 10 5 - 13 %    EOSINOPHILS 1 0 - 7 %    BASOPHILS 0 0 - 1 %    IMMATURE GRANULOCYTES 0 0.0 - 0.5 %    ABS. NEUTROPHILS 7.0 1.8 - 8.0 K/UL    ABS. LYMPHOCYTES 2.0 0.8 - 3.5 K/UL    ABS. MONOCYTES 1.0 0.0 - 1.0 K/UL    ABS. EOSINOPHILS 0.1 0.0 - 0.4 K/UL    ABS. BASOPHILS 0.0 0.0 - 0.1 K/UL    ABS. IMM.  GRANS. 0.0 0.00 - 0.04 K/UL    DF AUTOMATED     METABOLIC PANEL, COMPREHENSIVE    Collection Time: 05/29/18 12:38 PM   Result Value Ref Range    Sodium 126 (L) 136 - 145 mmol/L    Potassium 4.5 3.5 - 5.1 mmol/L    Chloride 91 (L) 97 - 108 mmol/L    CO2 22 21 - 32 mmol/L    Anion gap 13 5 - 15 mmol/L    Glucose 94 65 - 100 mg/dL    BUN 76 (H) 6 - 20 MG/DL    Creatinine 5.13 (H) 0.55 - 1.02 MG/DL    BUN/Creatinine ratio 15 12 - 20      GFR est AA 10 (L) >60 ml/min/1.73m2    GFR est non-AA 8 (L) >60 ml/min/1.73m2 Calcium 9.3 8.5 - 10.1 MG/DL    Bilirubin, total 0.5 0.2 - 1.0 MG/DL    ALT (SGPT) 70 12 - 78 U/L    AST (SGOT) 41 (H) 15 - 37 U/L    Alk. phosphatase 72 45 - 117 U/L    Protein, total 7.3 6.4 - 8.2 g/dL    Albumin 3.6 3.5 - 5.0 g/dL    Globulin 3.7 2.0 - 4.0 g/dL    A-G Ratio 1.0 (L) 1.1 - 2.2     MAGNESIUM    Collection Time: 05/29/18 12:38 PM   Result Value Ref Range    Magnesium 2.3 1.6 - 2.4 mg/dL   PHOSPHORUS    Collection Time: 05/29/18 12:38 PM   Result Value Ref Range    Phosphorus 5.1 (H) 2.6 - 4.7 MG/DL   URINALYSIS W/ REFLEX CULTURE    Collection Time: 05/29/18 12:59 PM   Result Value Ref Range    Color YELLOW/STRAW      Appearance CLEAR CLEAR      Specific gravity 1.008 1.003 - 1.030      pH (UA) 7.0 5.0 - 8.0      Protein 100 (A) NEG mg/dL    Glucose NEGATIVE  NEG mg/dL    Ketone NEGATIVE  NEG mg/dL    Bilirubin NEGATIVE  NEG      Blood SMALL (A) NEG      Urobilinogen 0.2 0.2 - 1.0 EU/dL    Nitrites NEGATIVE  NEG      Leukocyte Esterase NEGATIVE  NEG      WBC 0-4 0 - 4 /hpf    RBC 0-5 0 - 5 /hpf    Epithelial cells FEW FEW /lpf    Bacteria NEGATIVE  NEG /hpf    UA:UC IF INDICATED CULTURE NOT INDICATED BY UA RESULT CNI     GLUCOSE, POC    Collection Time: 05/29/18  8:26 PM   Result Value Ref Range    Glucose (POC) 92 65 - 100 mg/dL    Performed by Alejandro Hooks    CREATININE, UR, RANDOM    Collection Time: 05/29/18  8:41 PM   Result Value Ref Range    Creatinine, urine 28.50 mg/dL       Radiologic Studies -   US ABD COMP   Final Result      US RETROPERITONEUM LTD    (Results Pending)   CT ABD PELV WO CONT    (Results Pending)     CT Results  (Last 48 hours)    None        CXR Results  (Last 48 hours)    None            Medical Decision Making   I am the first provider for this patient. I reviewed the vital signs, available nursing notes, past medical history, past surgical history, family history and social history. Vital Signs-Reviewed the patient's vital signs.   Patient Vitals for the past 12 hrs:   Temp Pulse Resp BP SpO2   05/29/18 2239 - 83 18 153/50 98 %   05/29/18 2200 - 78 14 131/48 98 %   05/29/18 2130 - 76 15 161/58 98 %   05/29/18 2100 - 77 14 161/56 98 %   05/29/18 2030 - 75 12 154/66 99 %   05/29/18 2022 - 77 12 170/57 98 %   05/29/18 1937 98.2 °F (36.8 °C) 73 17 157/65 98 %   05/29/18 1900 - 80 14 175/61 96 %   05/29/18 1800 - 70 14 173/63 98 %   05/29/18 1730 - 73 15 189/62 99 %   05/29/18 1226 98.1 °F (36.7 °C) 81 15 197/85 100 %       Pulse Oximetry Analysis - 98% on RA    Cardiac Monitor:   Rate: 70s bpm  Rhythm: Normal Sinus Rhythm      Records Reviewed: Nursing Notes, Old Medical Records, Previous Radiology Studies and Previous Laboratory Studies    Provider Notes (Medical Decision Making): This is a 81year old female with the above history and physical exam findings who presents to the ER with reported renal failure. As noted above patient asymptomatic but found to have acute renal failure by her endocrinologist and sent in for nephrology evaluation. Patients vital signs on arrival are WNL and without any concerning findings. Discussion with endocrinologist endorsing that he is concerned she may have either an adverse reaction to amlodipine or the start of glomerulonephritis and has discussed this with nephrology. DDX: Acute Renal failure, hyperkalemia, electrolyte abnormality, glomerulonephritis, renal failure secondary to chronic DM vs HTN, dehydration. ED Course:   Initial assessment performed. The patients presenting problems have been discussed, and they are in agreement with the care plan formulated and outlined with them. I have encouraged them to ask questions as they arise throughout their visit. 4:50PM  Consultation to nephrology gained. 5:30PM  Spoke to nephrology whom will evaluate the patient here in the ER.     Medications   amLODIPine (NORVASC) tablet 5 mg ( Oral Canceled Entry 5/29/18 2023)   insulin glargine (LANTUS) injection 14 Units (not administered)   levothyroxine (SYNTHROID) tablet 150 mcg (150 mcg Oral Given 5/29/18 2310)   sodium chloride (NS) flush 5-10 mL ( IntraVENous Canceled Entry 5/29/18 2024)   sodium chloride (NS) flush 5-10 mL (not administered)   insulin lispro (HUMALOG) injection (0 Units SubCUTAneous Held 5/29/18 2027)   glucose chewable tablet 16 g (not administered)   dextrose (D50W) injection syrg 12.5-25 g (not administered)   glucagon (GLUCAGEN) injection 1 mg (not administered)   hydrALAZINE (APRESOLINE) 20 mg/mL injection 10 mg (not administered)   heparin (porcine) injection 5,000 Units (not administered)     7:00PM  Spoke with Hospitalist who will admit patient. I reviewed with the resident the medical history and the resident's findings on the physical examination. I discussed with the resident the patient's diagnosis and concur with the plan. Tavares Champion MD    Disposition:  Admission    PLAN:  1. Current Discharge Medication List        2. Follow-up Information     None        Return to ED if worse     Diagnosis     Clinical Impression:   1. Generalized weakness    2. Acute kidney injury ([facility] [address]    3. CKD (chronic kidney disease) stage 3, GFR 30-59 ml/min    4. Anemia, unspecified type    5. Elevated LFTs    6. Acute hyponatremia    7. Weight loss            This note will not be viewable in MyChart.

## 2018-05-29 NOTE — IP AVS SNAPSHOT
[address] [facility] 
[phone] Patient: Andrea Shepard MRN: [id number] YGN:[id number] About your hospitalization You were admitted on:  May 29, 2018 You last received care in the:  \[facility]\"" 3 [facility] You were discharged on:  June 2, 2018 Why you were hospitalized Your primary diagnosis was:  Not on File Your diagnoses also included:  Dm (Diabetes Mellitus) ([facility]), Severino (Acute Kidney Injury) ([facility]), Hypothyroidism Follow-up Information Follow up With Details Comments Contact Info Marc Padilla MD   [address] [place] [address] [phone] [facility] 
[phone] Discharge Orders None A check savannah indicates which time of day the medication should be taken. My Medications CHANGE how you take these medications Instructions Each Dose to Equal  
 Morning Noon Evening Bedtime  
 amLODIPine 5 mg tablet Commonly known as:  Carmen Gonzales What changed:  Another medication with the same name was removed. Continue taking this medication, and follow the directions you see here. Your last dose was: Your next dose is: Take 5 mg by mouth nightly. 5 mg ILEVRO 0.3 % Drps Generic drug:  nepafenac What changed:  Another medication with the same name was removed. Continue taking this medication, and follow the directions you see here. Your last dose was: Your next dose is:    
   
   
 Administer 1 Drop to left eye daily. 1 Drop  
    
   
   
   
  
 levothyroxine 150 mcg tablet Commonly known as:  SYNTHROID What changed:  Another medication with the same name was removed. Continue taking this medication, and follow the directions you see here. Your last dose was: Your next dose is: Take 150 mcg by mouth nightly. 150 mcg  
    
   
   
   
  
 prednisoLONE sodium phosphate 1 % ophthalmic solution Commonly known as:  INFLAMASE FORTE What changed:  Another medication with the same name was removed. Continue taking this medication, and follow the directions you see here. Your last dose was: Your next dose is:    
   
   
 Administer 1 Drop to left eye daily. 1 Drop CONTINUE taking these medications Instructions Each Dose to Equal  
 Morning Noon Evening Bedtime HumaLOG KwikPen Insulin 100 unit/mL kwikpen Generic drug:  insulin lispro Your last dose was: Your next dose is: INJECT 6 TO 12 UNITS SUBCUTANEOUSLY 3 TIMES A DAY BEFORE MEALS AS DIRECTED  Indications: type 1 diabetes mellitus LANTUS SOLOSTAR U-100 INSULIN 100 unit/mL (3 mL) Inpn Generic drug:  insulin glargine Your last dose was: Your next dose is:    
   
   
 14 Units by SubCUTAneous route daily. 14 Units MAGNESIUM PO Your last dose was: Your next dose is: Take 1 Tab by mouth daily as needed ('Leg Cramping'). 1 Tab RETAINE VISION PO Your last dose was: Your next dose is:    
   
   
 Administer 1 Drop to both eyes daily as needed ('Dry Eyes'). 1 Drop STOP taking these medications ADVIL 200 mg tablet Generic drug:  ibuprofen  
   
  
 irbesartan-hydroCHLOROthiazide 150-12.5 mg per tablet Commonly known as:  AVALIDE Discharge Instructions Our office will call pt with biopsy results-Mon or Tuesday [facility] [phone] to call us if she hasn't heard from us by then for appointment with Dr. Warner Beach in follow up. No heavy lifting or straining x 2 weeks. No aerobic x 2 weeks. If any dark urine, pain at biopsy site, bleeding-return to hospital for eval. 
Gia Hernandez MD 
 
 
  
  
  
[facility] Announcement  We are excited to announce that we are making your provider's discharge notes available to you in eZWay. You will see these notes when they are completed and signed by the physician that discharged you from your recent hospital stay. If you have any questions or concerns about any information you see in eZWay, please call the Health Information Department where you were seen or reach out to your Primary Care Provider for more information about your plan of care. Introducing [facility]! Juma Hall introduces eZWay patient portal. Now you can access parts of your medical record, email your doctor's office, and request medication refills online. 1. In your internet browser, go to [email or web address] 2. Click on the First Time User? Click Here link in the Sign In box. You will see the New Member Sign Up page. 3. Enter your eZWay Access Code exactly as it appears below. You will not need to use this code after youve completed the sign-up process. If you do not sign up before the expiration date, you must request a new code. · eZWay Access Code: [email or web address] Expires: 6/19/2018  1:38 PM 
 
4. Enter the last four digits of your Social Security Number (xxxx) and Date of Birth (mm/dd/yyyy) as indicated and click Submit. You will be taken to the next sign-up page. 5. Create a [employer] ID. This will be your eZWay login ID and cannot be changed, so think of one that is secure and easy to remember. 6. Create a eZWay password. You can change your password at any time. 7. Enter your Password Reset Question and Answer. This can be used at a later time if you forget your password. 8. Enter your e-mail address. You will receive e-mail notification when new information is available in [address] 9. Click Sign Up. You can now view and download portions of your medical record. 10. Click the Download Summary menu link to download a portable copy of your medical information. If you have questions, please visit the Frequently Asked Questions section of the [employer]hart website. Remember, PushCoin is NOT to be used for urgent needs. For medical emergencies, dial 911. Now available from your iPhone and Android! Introducing Payam Parra As a [facility] patient, I wanted to make you aware of our electronic visit tool called Payam Gandarafin. BeThereRewards/COZero allows you to connect within minutes with a medical provider 24 hours a day, seven days a week via a mobile device or tablet or logging into a secure website from your computer. You can access Payam Parra from anywhere in the [place]. A virtual visit might be right for you when you have a simple condition and feel like you just dont want to get out of bed, or cant get away from work for an appointment, when your regular [facility] provider is not available (evenings, weekends or holidays), or when youre out of town and need minor care. Electronic visits cost only $49 and if the [facility] Ubitricity/COZero provider determines a prescription is needed to treat your condition, one can be electronically transmitted to a nearby pharmacy*. Please take a moment to enroll today if you have not already done so. The enrollment process is free and takes just a few minutes. To enroll, please download the BeThereRewards/COZero kimmy to your tablet or phone, or visit [email or web address] to enroll on your computer. And, as an [address] [place] [address] patient with a Teachernow account, the results of your visits will be scanned into your electronic medical record and your primary care provider will be able to view the scanned results. We urge you to continue to see your regular [facility] provider for your ongoing medical care.   And while your primary care provider may not be the one available when you seek a Payam Parra virtual visit, the peace of mind you get from getting a real diagnosis real time can be priceless. For more information on Payam Chavarriaconrado, view our Frequently Asked Questions (FAQs) at [email or web address]. Sincerely, 
 
Karely Kenyon MD 
Chief Medical Officer [address]m *:  certain medications cannot be prescribed via Payam Parra Providers Seen During Your Hospitalization Provider Specialty Primary office phone Belem Gonzalez MD Emergency Medicine [phone] Loan Leal MD Emergency Medicine [phone] Kaveh Johnson MD Internal Medicine [phone] Your Primary Care Physician (PCP) Primary Care Physician Office Phone Office Fax Amarjit Solares [phone] You are allergic to the following Allergen Reactions Penicillins Unknown (comments) Recent Documentation Height Weight BMI OB Status Smoking Status 1.626 m 55.3 kg 20.93 kg/m2 Postmenopausal Never Smoker Emergency Contacts Name Discharge Info Relation Home Work Mobile Marlo Palmer DISCHARGE CAREGIVER [3] Spouse [3] [phone] [address] [place] [address] Amy  Patient [10] [phone] Price Monday  Spouse [3] [phone] Patient Belongings The following personal items are in your possession at time of discharge: 
  Dental Appliances: None  Visual Aid: Glasses, With patient      Home Medications: Kept at bedside   Jewelry: Tylene Myriam, With patient  Clothing: At bedside, Footwear, Pajamas, Undergarments    Other Valuables: At bedside, [employer], Leonor Juno Please provide this summary of care documentation to your next provider. Signatures-by signing, you are acknowledging that this After Visit Summary has been reviewed with you and you have received a copy. Patient Signature:  ____________________________________________________________ Date:  ____________________________________________________________  
  
Maynor Shackle Provider Signature:  ____________________________________________________________ Date:  ____________________________________________________________

## 2018-05-29 NOTE — IP AVS SNAPSHOT
[address] [facility] 
[phone] Patient: Blade Irwin MRN: [id number] NUK:[birth year] A check savannah indicates which time of day the medication should be taken. My Medications CHANGE how you take these medications Instructions Each Dose to Equal  
 Morning Noon Evening Bedtime  
 amLODIPine 5 mg tablet Commonly known as:  Adrianne Sam What changed:  Another medication with the same name was removed. Continue taking this medication, and follow the directions you see here. Your last dose was: Your next dose is: Take 5 mg by mouth nightly. 5 mg ILEVRO 0.3 % Drps Generic drug:  nepafenac What changed:  Another medication with the same name was removed. Continue taking this medication, and follow the directions you see here. Your last dose was: Your next dose is:    
   
   
 Administer 1 Drop to left eye daily. 1 Drop  
    
   
   
   
  
 levothyroxine 150 mcg tablet Commonly known as:  SYNTHROID What changed:  Another medication with the same name was removed. Continue taking this medication, and follow the directions you see here. Your last dose was: Your next dose is: Take 150 mcg by mouth nightly. 150 mcg  
    
   
   
   
  
 prednisoLONE sodium phosphate 1 % ophthalmic solution Commonly known as:  INFLAMASE FORTE What changed:  Another medication with the same name was removed. Continue taking this medication, and follow the directions you see here. Your last dose was: Your next dose is:    
   
   
 Administer 1 Drop to left eye daily. 1 Drop CONTINUE taking these medications Instructions Each Dose to Equal  
 Morning Noon Evening Bedtime HumaLOG KwikPen Insulin 100 unit/mL kwikpen Generic drug:  insulin lispro Your last dose was: Your next dose is: INJECT 6 TO 12 UNITS SUBCUTANEOUSLY 3 TIMES A DAY BEFORE MEALS AS DIRECTED  Indications: type 1 diabetes mellitus LANTUS SOLOSTAR U-100 INSULIN 100 unit/mL (3 mL) Inpn Generic drug:  insulin glargine Your last dose was: Your next dose is:    
   
   
 14 Units by SubCUTAneous route daily. 14 Units MAGNESIUM PO Your last dose was: Your next dose is: Take 1 Tab by mouth daily as needed ('Leg Cramping'). 1 Tab RETAINE VISION PO Your last dose was: Your next dose is:    
   
   
 Administer 1 Drop to both eyes daily as needed ('Dry Eyes'). 1 Drop STOP taking these medications ADVIL 200 mg tablet Generic drug:  ibuprofen  
   
  
 irbesartan-hydroCHLOROthiazide 150-12.5 mg per tablet Commonly known as:  AVALIDE

## 2018-05-29 NOTE — H&P
Hospitalist Admission Note    NAME: Alva Estrada   :  1945   MRN:  [id number]     Date/Time:  2018 7:47 PM    Patient PCP: Kayla Benedict MD  ________________________________________________________________________    My assessment of this patient's clinical condition and my plan of care is as follows. Assessment / Plan:  1) MELISSA: no uti, patient with DM but renal function never this high. Will do renal US, consult nephrology for further eval and recs. Will order NOLAN, Anti Neuthrophil Cyt AB, Free Light Chains, Immunoelectrophoresis, ferritin, PTH, sodium Urine, Microalbumin Urine, Will also do CT abdomen to r/o renal disection    2) DM: Continue home insulin will add RISS    3) Ao murmur: Will do echocardiogram    4) Hypothyroidism: Continue levothyroxin    5) HTN: Continue home meds: Will D/C ARB, add hydralazine PRN        Code Status: full  Surrogate Decision Maker:    DVT Prophylaxis: yes  GI Prophylaxis: not indicated    Baseline: lives at home        Subjective:   CHIEF COMPLAINT: weakness    HISTORY OF PRESENT ILLNESS:     Alva Estrada is a 81 y.o. PMH DM for 46 years,HTN who was feeling weak last week so she went to see her endocrinologist who order Blood work finding an elevated Cr. Because nephrologist was not able to be contacted over the weekend  Patient was advised to come to the ED for further eval and management    We were asked to admit for work up and evaluation of the above problems. Past Medical History:   Diagnosis Date    Hypertension     Hypothyroidism     Type I diabetes mellitus ([facility]         Past Surgical History:   Procedure Laterality Date    COLONOSCOPY N/A 10/2/2017    COLONOSCOPY performed by Estelita Younger.  Vashti Saucedo MD [facility] ENDOSCOPY    HX APPENDECTOMY      HX  SECTION      HX TONSILLECTOMY      HX TUBAL LIGATION         Social History   Substance Use Topics    Smoking status: Never Smoker    Smokeless tobacco: Never Used    Alcohol use No        History reviewed. No pertinent family history. Allergies   Allergen Reactions    Penicillins Unknown (comments)        Prior to Admission medications    Medication Sig Start Date End Date Taking? Authorizing Provider   amLODIPine (NORVASC) 5 mg tablet Take 5 mg by mouth nightly. Yes Historical Provider   nepafenac (ILEVRO) 0.3 % drps Administer 1 Drop to left eye daily. Yes Historical Provider   irbesartan-hydroCHLOROthiazide (AVALIDE) 150-12.5 mg per tablet Take 2 Tabs by mouth nightly. Yes Historical Provider   prednisoLONE sodium phosphate (INFLAMASE FORTE) 1 % ophthalmic solution Administer 1 Drop to left eye daily. Yes Historical Provider   levothyroxine (SYNTHROID) 150 mcg tablet Take 150 mcg by mouth nightly. Yes Historical Provider   vit C/E/Zn//lut/jennifer/bio/saf (RETAINE VISION PO) Administer 1 Drop to both eyes daily as needed ('Dry Eyes'). Yes Historical Provider   ibuprofen (ADVIL) 200 mg tablet Take 400 mg by mouth daily as needed ('Headache'). Yes Historical Provider   MAGNESIUM PO Take 1 Tab by mouth daily as needed ('Leg Cramping'). Yes Historical Provider   HUMALOG KWIKPEN INSULIN 100 unit/mL kwikpen INJECT 6 TO 12 UNITS SUBCUTANEOUSLY 3 TIMES A DAY BEFORE MEALS AS DIRECTED  Indications: type 1 diabetes mellitus 4/3/18  Yes Ilia Huertas MD   LANTUS SOLOSTAR 100 unit/mL (3 mL) inpn INJECT 19 UNITS SUBCUTANEOUSLY DAILY 11/29/17  Yes Ilia Huertas MD       REVIEW OF SYSTEMS:     I am not able to complete the review of systems because:    The patient is intubated and sedated    The patient has altered mental status due to his acute medical problems    The patient has baseline aphasia from prior stroke(s)    The patient has baseline dementia and is not reliable historian    The patient is in acute medical distress and unable to provide information           Total of 12 systems reviewed as follows:       POSITIVE= underlined text  Negative = text not underlined  General:  fever, chills, sweats, generalized weakness, weight loss/gain,      loss of appetite   Eyes:    blurred vision, eye pain, loss of vision, double vision  ENT:    rhinorrhea, pharyngitis   Respiratory:   cough, sputum production, SOB, COPPOLA, wheezing, pleuritic pain   Cardiology:   chest pain, palpitations, orthopnea, PND, edema, syncope   Gastrointestinal:  abdominal pain , N/V, diarrhea, dysphagia, constipation, bleeding   Genitourinary:  frequency, urgency, dysuria, hematuria, incontinence   Muskuloskeletal :  arthralgia, myalgia, back pain  Hematology:  easy bruising, nose or gum bleeding, lymphadenopathy   Dermatological: rash, ulceration, pruritis, color change / jaundice  Endocrine:   hot flashes or polydipsia   Neurological:  headache, dizziness, confusion, focal weakness, paresthesia,     Speech difficulties, memory loss, gait difficulty  Psychological: Feelings of anxiety, depression, agitation    Objective:   VITALS:    Visit Vitals    /63    Pulse 70    Temp 98.1 °F (36.7 °C)    Resp 14    Ht 5' 4\" (1.626 m)    Wt 58.1 kg (128 lb 1.4 oz)    SpO2 98%    BMI 21.99 kg/m2       PHYSICAL EXAM:    General:    Alert, cooperative, no distress, appears stated age. HEENT: Atraumatic, anicteric sclerae, pink conjunctivae     No oral ulcers, mucosa moist, throat clear, dentition fair  Neck:  Supple, symmetrical,  thyroid: non tender  Lungs:   Clear to auscultation bilaterally. No Wheezing or Rhonchi. No rales. Chest wall:  No tenderness  No Accessory muscle use. Heart:   Systolic murmur  Ao, Regular  rhythm,    No edema  Abdomen:   Soft, non-tender. Not distended. Bowel sounds normal  Extremities: No cyanosis. No clubbing,      Skin turgor normal, Capillary refill normal, Radial dial pulse 2+  Skin:     Not pale. Not Jaundiced  No rashes   Psych:  Good insight. Not depressed. Not anxious or agitated. Neurologic: EOMs intact. No facial asymmetry.  No aphasia or slurred speech. Symmetrical strength, Sensation grossly intact. Alert and oriented X 4.     _______________________________________________________________________  Care Plan discussed with:    Comments   Patient x    Family      RN     Care Manager                    Consultant:      _______________________________________________________________________  Expected  Disposition:   Home with Family x   HH/PT/OT/RN    SNF/LTC    CITLALI    ________________________________________________________________________  TOTAL TIME:  61 Minutes    Critical Care Provided     Minutes non procedure based      Comments    x Reviewed previous records   >50% of visit spent in counseling and coordination of care  Discussion with patient and/or family and questions answered       ________________________________________________________________________  Signed: John Khanna MD    Procedures: see electronic medical records for all procedures/Xrays and details which were not copied into this note but were reviewed prior to creation of Plan. LAB DATA REVIEWED:    Recent Results (from the past 24 hour(s))   CBC WITH AUTOMATED DIFF    Collection Time: 05/29/18 12:38 PM   Result Value Ref Range    WBC 10.1 3.6 - 11.0 K/uL    RBC 2.70 (L) 3.80 - 5.20 M/uL    HGB 8.5 (L) 11.5 - 16.0 g/dL    HCT 25.4 (L) 35.0 - 47.0 %    MCV 94.1 80.0 - 99.0 FL    MCH 31.5 26.0 - 34.0 PG    MCHC 33.5 30.0 - 36.5 g/dL    RDW 12.5 11.5 - 14.5 %    PLATELET 324 389 - 681 K/uL    MPV 12.1 8.9 - 12.9 FL    NRBC 0.0 0  WBC    ABSOLUTE NRBC 0.00 0.00 - 0.01 K/uL    NEUTROPHILS 69 32 - 75 %    LYMPHOCYTES 20 12 - 49 %    MONOCYTES 10 5 - 13 %    EOSINOPHILS 1 0 - 7 %    BASOPHILS 0 0 - 1 %    IMMATURE GRANULOCYTES 0 0.0 - 0.5 %    ABS. NEUTROPHILS 7.0 1.8 - 8.0 K/UL    ABS. LYMPHOCYTES 2.0 0.8 - 3.5 K/UL    ABS. MONOCYTES 1.0 0.0 - 1.0 K/UL    ABS. EOSINOPHILS 0.1 0.0 - 0.4 K/UL    ABS. BASOPHILS 0.0 0.0 - 0.1 K/UL    ABS. IMM.  GRANS. 0.0 0.00 - 0.04 K/UL DF AUTOMATED     METABOLIC PANEL, COMPREHENSIVE    Collection Time: 05/29/18 12:38 PM   Result Value Ref Range    Sodium 126 (L) 136 - 145 mmol/L    Potassium 4.5 3.5 - 5.1 mmol/L    Chloride 91 (L) 97 - 108 mmol/L    CO2 22 21 - 32 mmol/L    Anion gap 13 5 - 15 mmol/L    Glucose 94 65 - 100 mg/dL    BUN 76 (H) 6 - 20 MG/DL    Creatinine 5.13 (H) 0.55 - 1.02 MG/DL    BUN/Creatinine ratio 15 12 - 20      GFR est AA 10 (L) >60 ml/min/1.73m2    GFR est non-AA 8 (L) >60 ml/min/1.73m2    Calcium 9.3 8.5 - 10.1 MG/DL    Bilirubin, total 0.5 0.2 - 1.0 MG/DL    ALT (SGPT) 70 12 - 78 U/L    AST (SGOT) 41 (H) 15 - 37 U/L    Alk.  phosphatase 72 45 - 117 U/L    Protein, total 7.3 6.4 - 8.2 g/dL    Albumin 3.6 3.5 - 5.0 g/dL    Globulin 3.7 2.0 - 4.0 g/dL    A-G Ratio 1.0 (L) 1.1 - 2.2     MAGNESIUM    Collection Time: 05/29/18 12:38 PM   Result Value Ref Range    Magnesium 2.3 1.6 - 2.4 mg/dL   PHOSPHORUS    Collection Time: 05/29/18 12:38 PM   Result Value Ref Range    Phosphorus 5.1 (H) 2.6 - 4.7 MG/DL   URINALYSIS W/ REFLEX CULTURE    Collection Time: 05/29/18 12:59 PM   Result Value Ref Range    Color YELLOW/STRAW      Appearance CLEAR CLEAR      Specific gravity 1.008 1.003 - 1.030      pH (UA) 7.0 5.0 - 8.0      Protein 100 (A) NEG mg/dL    Glucose NEGATIVE  NEG mg/dL    Ketone NEGATIVE  NEG mg/dL    Bilirubin NEGATIVE  NEG      Blood SMALL (A) NEG      Urobilinogen 0.2 0.2 - 1.0 EU/dL    Nitrites NEGATIVE  NEG      Leukocyte Esterase NEGATIVE  NEG      WBC 0-4 0 - 4 /hpf    RBC 0-5 0 - 5 /hpf    Epithelial cells FEW FEW /lpf    Bacteria NEGATIVE  NEG /hpf    UA:UC IF INDICATED CULTURE NOT INDICATED BY UA RESULT CNI

## 2018-05-29 NOTE — ED NOTES
Bedside and Verbal shift change report given to EVARISTO Hernandez (oncoming nurse) by Rizwan Friedman (offgoing nurse). Report included the following information SBAR, ED Summary, Intake/Output, MAR and Recent Results. Monitor x 3. Call bell in reach. Bed in lowest position, with side rails up x 2. Pt updated on plan of care.

## 2018-05-29 NOTE — ED TRIAGE NOTES
Pt. Presents to ED today after being sent by her endocrinologist for elevated Creatinine levels. Patient states that she is shaky and weak. No other symptoms reported. Patient wears a permanent glucometer to her R abdomen. Pt. Alert and oriented x4. Pt. Placed in position of comfort with call bell in reach.

## 2018-05-29 NOTE — PROGRESS NOTES
Pharmacy Clarification of Prior to Admission Medication Regimen     The patient was interviewed regarding clarification of the prior to admission medication regimen. Patient's daughter and  were present in room and obtained permission from patient to discuss drug regimen with visitor(s) present. Patient was questioned regarding use of any other inhalers, topical products, over the counter medications, herbal medications, vitamin products or ophthalmic/nasal/otic medication use. Information Obtained From: Patient and Rx Query    Pertinent Pharmacy Findings:  Barbara Mendieta [facility] INSULIN 100 unit/mL kwikpen and LANTUS SOLOSTAR 100 unit/mL (3 mL) inpn: Patient stated that these agents 'are written for more than what she takes because the pharmacy never gives me enough.' Patient stated that today she has had '3 units of the humalog and 14 units of the lantus.'     PTA medication list was corrected to the following:     Prior to Admission Medications   Prescriptions Last Dose Informant Patient Reported? Taking? HUMALOG KWIKPEN INSULIN 100 unit/mL kwikpen 5/29/2018 at Unknown time Self No Yes   Sig: INJECT 6 TO 12 UNITS SUBCUTANEOUSLY 3 TIMES A DAY BEFORE MEALS AS DIRECTED  Indications: type 1 diabetes mellitus   LANTUS SOLOSTAR 100 unit/mL (3 mL) inpn 5/29/2018 at Unknown time Self No Yes   Sig: INJECT 19 UNITS SUBCUTANEOUSLY DAILY   MAGNESIUM PO 5/28/2018 at Unknown time Self Yes Yes   Sig: Take 1 Tab by mouth daily as needed ('Leg Cramping'). amLODIPine (NORVASC) 5 mg tablet 5/28/2018 at Unknown time Self Yes Yes   Sig: Take 5 mg by mouth nightly. ibuprofen (ADVIL) 200 mg tablet 5/27/2018 at Unknown time Self Yes Yes   Sig: Take 400 mg by mouth daily as needed ('Headache'). irbesartan-hydroCHLOROthiazide (AVALIDE) 150-12.5 mg per tablet 5/28/2018 at Unknown time Self Yes Yes   Sig: Take 2 Tabs by mouth nightly.    levothyroxine (SYNTHROID) 150 mcg tablet 5/28/2018 at Unknown time Self Yes Yes   Sig: Take 150 mcg by mouth nightly. nepafenac (ILEVRO) 0.3 % drps 5/28/2018 at Unknown time Self Yes Yes   Sig: Administer 1 Drop to left eye daily. prednisoLONE sodium phosphate (INFLAMASE FORTE) 1 % ophthalmic solution 5/28/2018 at Unknown time Self Yes Yes   Sig: Administer 1 Drop to left eye daily. vit C/E/Zn//lut/jennifer/bio/saf (RETAINE VISION PO) 5/29/2018 at Unknown time Self Yes Yes   Sig: Administer 1 Drop to both eyes daily as needed ('Dry Eyes').       Facility-Administered Medications: None          Thank you,  Sulema Sierra CPhT  Medication History Pharmacy Technician

## 2018-05-30 ENCOUNTER — APPOINTMENT (OUTPATIENT)
Dept: ULTRASOUND IMAGING | Age: 73
DRG: 683 | End: 2018-05-30
Attending: INTERNAL MEDICINE
Payer: MEDICARE

## 2018-05-30 ENCOUNTER — APPOINTMENT (OUTPATIENT)
Dept: CT IMAGING | Age: 73
DRG: 683 | End: 2018-05-30
Attending: INTERNAL MEDICINE
Payer: MEDICARE

## 2018-05-30 LAB
ANION GAP SERPL CALC-SCNC: 11 MMOL/L (ref 5–15)
BUN SERPL-MCNC: 74 MG/DL (ref 6–20)
BUN/CREAT SERPL: 15 (ref 12–20)
CALCIUM SERPL-MCNC: 8.2 MG/DL (ref 8.5–10.1)
CALCIUM SERPL-MCNC: 9 MG/DL (ref 8.5–10.1)
CHLORIDE SERPL-SCNC: 91 MMOL/L (ref 97–108)
CO2 SERPL-SCNC: 23 MMOL/L (ref 21–32)
CREAT SERPL-MCNC: 4.96 MG/DL (ref 0.55–1.02)
ERYTHROCYTE [DISTWIDTH] IN BLOOD BY AUTOMATED COUNT: 12.4 % (ref 11.5–14.5)
FERRITIN SERPL-MCNC: 122 NG/ML (ref 8–252)
GLUCOSE BLD STRIP.AUTO-MCNC: 101 MG/DL (ref 65–100)
GLUCOSE BLD STRIP.AUTO-MCNC: 101 MG/DL (ref 65–100)
GLUCOSE BLD STRIP.AUTO-MCNC: 135 MG/DL (ref 65–100)
GLUCOSE BLD STRIP.AUTO-MCNC: 50 MG/DL (ref 65–100)
GLUCOSE BLD STRIP.AUTO-MCNC: 51 MG/DL (ref 65–100)
GLUCOSE SERPL-MCNC: 100 MG/DL (ref 65–100)
HBV SURFACE AB SER QL: NONREACTIVE
HBV SURFACE AB SER-ACNC: <3.1 MIU/ML
HBV SURFACE AG SER QL: 0.62 INDEX
HBV SURFACE AG SER QL: NEGATIVE
HCT VFR BLD AUTO: 21.6 % (ref 35–47)
HCV AB SERPL QL IA: NONREACTIVE
HCV COMMENT,HCGAC: NORMAL
HGB BLD-MCNC: 7.2 G/DL (ref 11.5–16)
IRON SATN MFR SERPL: 20 % (ref 20–50)
IRON SERPL-MCNC: 55 UG/DL (ref 35–150)
MAGNESIUM SERPL-MCNC: 2.1 MG/DL (ref 1.6–2.4)
MCH RBC QN AUTO: 30.9 PG (ref 26–34)
MCHC RBC AUTO-ENTMCNC: 33.3 G/DL (ref 30–36.5)
MCV RBC AUTO: 92.7 FL (ref 80–99)
NRBC # BLD: 0 K/UL (ref 0–0.01)
NRBC BLD-RTO: 0 PER 100 WBC
PHOSPHATE SERPL-MCNC: 5.4 MG/DL (ref 2.6–4.7)
PLATELET # BLD AUTO: 209 K/UL (ref 150–400)
PMV BLD AUTO: 12.1 FL (ref 8.9–12.9)
POTASSIUM SERPL-SCNC: 4.1 MMOL/L (ref 3.5–5.1)
PTH-INTACT SERPL-MCNC: 173 PG/ML (ref 18.4–88)
RBC # BLD AUTO: 2.33 M/UL (ref 3.8–5.2)
SERVICE CMNT-IMP: ABNORMAL
SODIUM SERPL-SCNC: 125 MMOL/L (ref 136–145)
TIBC SERPL-MCNC: 274 UG/DL (ref 250–450)
WBC # BLD AUTO: 8.6 K/UL (ref 3.6–11)

## 2018-05-30 PROCEDURE — 65660000000 HC RM CCU STEPDOWN

## 2018-05-30 PROCEDURE — 74011250636 HC RX REV CODE- 250/636: Performed by: INTERNAL MEDICINE

## 2018-05-30 PROCEDURE — 82962 GLUCOSE BLOOD TEST: CPT

## 2018-05-30 PROCEDURE — 83735 ASSAY OF MAGNESIUM: CPT | Performed by: INTERNAL MEDICINE

## 2018-05-30 PROCEDURE — 85027 COMPLETE CBC AUTOMATED: CPT | Performed by: INTERNAL MEDICINE

## 2018-05-30 PROCEDURE — 84100 ASSAY OF PHOSPHORUS: CPT | Performed by: INTERNAL MEDICINE

## 2018-05-30 PROCEDURE — 74011250637 HC RX REV CODE- 250/637: Performed by: INTERNAL MEDICINE

## 2018-05-30 PROCEDURE — 74011636637 HC RX REV CODE- 636/637: Performed by: INTERNAL MEDICINE

## 2018-05-30 PROCEDURE — 36415 COLL VENOUS BLD VENIPUNCTURE: CPT | Performed by: INTERNAL MEDICINE

## 2018-05-30 PROCEDURE — 80048 BASIC METABOLIC PNL TOTAL CA: CPT | Performed by: INTERNAL MEDICINE

## 2018-05-30 PROCEDURE — 82728 ASSAY OF FERRITIN: CPT | Performed by: INTERNAL MEDICINE

## 2018-05-30 PROCEDURE — 83970 ASSAY OF PARATHORMONE: CPT | Performed by: INTERNAL MEDICINE

## 2018-05-30 PROCEDURE — 74176 CT ABD & PELVIS W/O CONTRAST: CPT

## 2018-05-30 PROCEDURE — 83540 ASSAY OF IRON: CPT | Performed by: INTERNAL MEDICINE

## 2018-05-30 RX ORDER — CLONIDINE HYDROCHLORIDE 0.1 MG/1
0.1 TABLET ORAL
Status: DISCONTINUED | OUTPATIENT
Start: 2018-05-30 | End: 2018-06-02 | Stop reason: HOSPADM

## 2018-05-30 RX ADMIN — Medication 10 ML: at 06:00

## 2018-05-30 RX ADMIN — LEVOTHYROXINE SODIUM 150 MCG: 150 TABLET ORAL at 22:18

## 2018-05-30 RX ADMIN — Medication 10 ML: at 14:38

## 2018-05-30 RX ADMIN — AMLODIPINE BESYLATE 5 MG: 5 TABLET ORAL at 22:18

## 2018-05-30 RX ADMIN — INSULIN LISPRO 2 UNITS: 100 INJECTION, SOLUTION INTRAVENOUS; SUBCUTANEOUS at 08:52

## 2018-05-30 RX ADMIN — Medication 10 ML: at 22:18

## 2018-05-30 RX ADMIN — INSULIN GLARGINE 14 UNITS: 100 INJECTION, SOLUTION SUBCUTANEOUS at 08:58

## 2018-05-30 RX ADMIN — HEPARIN SODIUM 5000 UNITS: 5000 INJECTION, SOLUTION INTRAVENOUS; SUBCUTANEOUS at 16:43

## 2018-05-30 RX ADMIN — IRON SUCROSE 200 MG: 20 INJECTION, SOLUTION INTRAVENOUS at 16:43

## 2018-05-30 NOTE — PROGRESS NOTES
CM attempted to meet with pt but physician was in room them pt was taken DYLAN to have procedure completed. CM will follow up with pt later.      Isabella Baptiste, MSW, [employer]   [phone]

## 2018-05-30 NOTE — PROGRESS NOTES
Bedside and Verbal shift change report given to Tc Rodriguez (oncoming nurse) by Farnaz Solorio (offgoing nurse). Report included the following information SBAR, Kardex, Intake/Output, MAR, Accordion and Recent Results. No complaints of pain. Plan to have 2D echo done tomorrow.

## 2018-05-30 NOTE — PROGRESS NOTES
Ultrasound of Abdomen was completed last night At 8pm     This includes Renal imagining  and Bladder was also imaged. Please look at Abdomen report for Renal information.

## 2018-05-30 NOTE — ED NOTES
Pt informed staff that she was self-dosing insulin w/ flex pen. Pt educated on refraining from self dosing w/ doctors orders & pharmacy verification. Pt s/w own endocrinologist who informed patient of same thing while in hospital setting and that it was important to get lantus daily and to reassess humalog ssi daily. Pt now feeling reassured and confirmed will no longer self-dose during admission.

## 2018-05-30 NOTE — CONSULTS
Ms. Janice Nicole is a 68-year-old white female with a 46 year history of type 1 diabetes mellitus and very mild chronic kidney disease who I saw on Friday for routine follow-up. Her hemoglobin A1c was well controlled but her blood pressure was a bit elevated. I repeated screening screening laboratory tests at that time and values returned with a serum creatinine of 5 and a normal serum bicarb. She was admitted for evaluation and management of acute kidney disease. Repeat laboratory tests have confirmed the elevated serum creatinine as well as the documented elevated PTH. Ultrasound was negative for obstruction and CT scan was likewise unremarkable. Hemoglobin is now 7.2. Her blood sugars are well controlled. I very much appreciate Dr. Amy Arango  help in this evaluation. Examination  Blood pressure 145/66  Pulse 76  Afebrile  HEENT unremarkable  Lungs clear  Heart reveals a regular rate and rhythm with a 2/6 systolic ejection murmur  Abdomen soft and nontender  Extremities unremarkable    Impression type 1 diabetes mellitus with acute on chronic kidney disease currently undergoing thorough evaluation. Recommendations are that she continue to receive her 14 units of Lantus insulin and Humalog insulin according to her meals. I would ask that she be permitted to adjust her Humalog dosing according to her blood sugar and the amount of carbohydrate that she chooses to eat. Her evaluation will continue to be managed by Dr. Ashia Gomez.

## 2018-05-30 NOTE — PROGRESS NOTES
Spiritual Care Partner Volunteer visited patient in 5/30/18 on [address]. Documented by:  JANEL Taylor

## 2018-05-30 NOTE — PROGRESS NOTES
Hospitalist Progress Note    NAME: Emelyn Covert   :  1945   MRN:  [id number]       Assessment / Plan:  MELISSA on CKD 3  DMI, 52 year history  Nephrotic proteinura  Anemia  Secondary Hyperparathyroidism  Hyponatremia  -Continue tele admission  -Nephrology help appreciated - still concerned about acute component in regards to her sudden worsening renal function  -Na improving, fluid restriction - pt states that she drinks \"a lot\"  -Pt with very specific requests in regards to her DM care - appreciate Endo note. Advised her and her family that her FS control is still acceptable and at the moment the greater concern lies with her kidney function.   -Pt also refusing Heparin and wants to ambulate and \"excercise\" have told her to take Heparin or SCDs at least and that she can ambulate the hallways but just do it in the evening when it is less busy  -PTH, Ca and Phos levels indicate secondary hyperpara  -H/H noted, continue to monitor    Body mass index is 21.99 kg/(m^2). Code status: Full  Prophylaxis: Hep SQ or SCD, depending on what pt will allow  Recommended Disposition: Home w/Family     Subjective:     Chief Complaint / Reason for Physician Visit  \"I want to take insulin like I take it. I don't want to take Heparin. I want to exercise and walk around\". Discussed with RN events overnight. Review of Systems:  Symptom Y/N Comments  Symptom Y/N Comments   Fever/Chills n   Chest Pain n    Poor Appetite n   Edema     Cough    Abdominal Pain n    Sputum    Joint Pain     SOB/COPPOLA n   Pruritis/Rash     Nausea/vomit    Tolerating PT/OT     Diarrhea    Tolerating Diet y    Constipation    Other       Could NOT obtain due to:      Objective:     VITALS:   Last 24hrs VS reviewed since prior progress note.  Most recent are:  Patient Vitals for the past 24 hrs:   Temp Pulse Resp BP SpO2   18 0830 98.4 °F (36.9 °C) 78 20 145/66 100 %   18 2341 98.2 °F (36.8 °C) 79 18 170/47 98 %   18 2303 - 78 18 150/51 97 %   05/29/18 2300 - 80 16 148/49 97 %   05/29/18 2239 - 83 18 153/50 98 %   05/29/18 2200 - 78 14 131/48 98 %   05/29/18 2130 - 76 15 161/58 98 %   05/29/18 2100 - 77 14 161/56 98 %   05/29/18 2030 - 75 12 154/66 99 %   05/29/18 2022 - 77 12 170/57 98 %   05/29/18 1937 98.2 °F (36.8 °C) 73 17 157/65 98 %   05/29/18 1900 - 80 14 175/61 96 %   05/29/18 1800 - 70 14 173/63 98 %   05/29/18 1730 - 73 15 189/62 99 %       Intake/Output Summary (Last 24 hours) at 05/30/18 1631  Last data filed at 05/30/18 1400   Gross per 24 hour   Intake              364 ml   Output                0 ml   Net              364 ml        PHYSICAL EXAM:  General: WD, WN. Alert, cooperative, no acute distress    EENT:  EOMI. Anicteric sclerae. MMM  Resp:  CTA bilaterally, no wheezing or rales. No accessory muscle use  CV:  Regular  rhythm,  No edema  GI:  Soft, Non distended, Non tender.  +Bowel sounds  Neurologic:  Alert and oriented X 3, normal speech  Psych:   Good insight. Not anxious nor agitated  Skin:  No rashes. No jaundice    Reviewed most current lab test results and cultures  YES  Reviewed most current radiology test results   YES  Review and summation of old records today    NO  Reviewed patient's current orders and MAR    YES  PMH/ reviewed - no change compared to H&P  ________________________________________________________________________  Care Plan discussed with:    Comments   Patient x    Family      RN x    Care Manager x    Consultant  x                      Multidiciplinary team rounds were held today with , nursing, pharmacist and clinical coordinator. Patient's plan of care was discussed; medications were reviewed and discharge planning was addressed.      ________________________________________________________________________  Total NON critical care TIME:  25   Minutes    Total CRITICAL CARE TIME Spent:   Minutes non procedure based      Comments   >50% of visit spent in counseling and coordination of care     ________________________________________________________________________  Jenelle Lord MD     Procedures: see electronic medical records for all procedures/Xrays and details which were not copied into this note but were reviewed prior to creation of Plan. LABS:  I reviewed today's most current labs and imaging studies.   Pertinent labs include:  Recent Labs      05/30/18   0028  05/29/18   1238   WBC  8.6  10.1   HGB  7.2*  8.5*   HCT  21.6*  25.4*   PLT  209  246     Recent Labs      05/30/18   0028  05/29/18   1238   NA  125*  126*   K  4.1  4.5   CL  91*  91*   CO2  23  22   GLU  100  94   BUN  74*  76*   CREA  4.96*  5.13*   CA  8.2*  9.0  9.3   MG  2.1  2.3   PHOS  5.4*  5.1*   ALB   --   3.6   TBILI   --   0.5   SGOT   --   41*   ALT   --   70       Signed: Jenelle Lord MD

## 2018-05-30 NOTE — PROGRESS NOTES
Bedside and Verbal shift change report given to Leann Wise (oncoming nurse) by Daksha Reynolds (offgoing nurse). Report included the following information SBAR, Kardex, Intake/Output, MAR and Recent Results.

## 2018-05-30 NOTE — PROGRESS NOTES
Primary Nurse Grey Apley, RN and Vito Ornelas RN performed a dual skin assessment on this patient No impairment noted  Raymond score is 23

## 2018-05-30 NOTE — PROGRESS NOTES
NAME: Bosie Fleischer        :  1945        MRN:  [id number]        Assessment :    Plan:  --CKD-3  MELISSA  Nephrotic range proteinuria  Anemia  Hyperphosphatemia  Increased LFT\"s   Abdominal discomfort  Weight loss  Hyponatremia  DM - type I  NSAID use  Secondary hyperparathyroidism --Creatinine a little better. Fair UO. UACR over 3000. Could be from diabetes or another glomerular disorder. NSAID's could play a role, but she says that she wasn't taking them often. Hep B and C are negative. NOLAN, ANCA, VÍCTOR and light chain ratio are pending. U/S shows echogenic kidneys suggesting some chronicity. I had a discussion with the patient, her  and her daughter. I told them that, at this point, I suspect she has mild underlying diabetic nephropathy but that I think there is a larger acute component. I cannot find any clear insult to explain her MELISSA. Will await serologies and consider renal biopsy. Unless her creatinine declines significantly, I think the possible necessity of renal biopsy is fairly high. Sodium is about the same and this is likely dilutional as she readily says that she was drinking plenty of water prior to admission. As well with her increased creatinine her ability to excrete free water is reduced. She says that she has cut back on her fluid intake. I've placed her on a fluid restriction. She wants to exercise. I told her that she could walk around the hospital floor but asked her to be sure to tell the nurses when she does so. She is reluctant to take the heparin SQ. I encouraged her to take that given the increased risk of clots with significant proteinuria. She is anemic and her iron parameters are on the low side. I'll give her a dose of IV iron. She may end up needing an VANITA. Continue off of ARB and diuretic. I've added clonidine PRN.     Low calcium with elevated phos and elevated PTH suggest secondary hyperparathyroidism. Subjective:     Chief Complaint:  \"Can I exercise? \"  Feels better. Still notes trace ankle edema. Review of Systems:    Symptom Y/N Comments  Symptom Y/N Comments   Fever/Chills    Chest Pain     Poor Appetite    Edema     Cough    Abdominal Pain     Sputum    Joint Pain     SOB/COPPOLA    Pruritis/Rash     Nausea/vomit    Tolerating PT/OT     Diarrhea    Tolerating Diet     Constipation    Other       Could not obtain due to:      Objective:     VITALS:   Last 24hrs VS reviewed since prior progress note. Most recent are:  Visit Vitals    /66    Pulse 78    Temp 98.4 °F (36.9 °C)    Resp 20    Ht 5' 4\" (1.626 m)    Wt 58.1 kg (128 lb 1.4 oz)    SpO2 100%    BMI 21.99 kg/m2     No intake or output data in the 24 hours ending 05/30/18 1529   Telemetry Reviewed:     PHYSICAL EXAM:  General: NAD  Trace ankle edema      Lab Data Reviewed: (see below)    Medications Reviewed: (see below)    PMH/SH reviewed - no change compared to H&P  ________________________________________________________________________  Care Plan discussed with:  Patient y    Family  y  and dtr   RN     Care Manager                    Consultant:          Comments   >50% of visit spent in counseling and coordination of care y I spent a total of 40 minutes, more than 1/2 of which was spent discussing the above.     ________________________________________________________________________  Alivia Carpio MD     Procedures: see electronic medical records for all procedures/Xrays and details which  were not copied into this note but were reviewed prior to creation of Plan.       LABS:  Recent Labs      05/30/18   0028  05/29/18   1238   WBC  8.6  10.1   HGB  7.2*  8.5*   HCT  21.6*  25.4*   PLT  209  246     Recent Labs      05/30/18   0028  05/29/18   1238   NA  125*  126*   K  4.1  4.5   CL  91*  91*   CO2  23  22   BUN  74*  76*   CREA  4.96*  5.13*   GLU  100  94   CA  8.2* 9.0  9.3   MG  2.1  2.3   PHOS  5.4*  5.1*     Recent Labs      05/29/18   1238   SGOT  41*   AP  72   TP  7.3   ALB  3.6   GLOB  3.7     No results for input(s): INR, PTP, APTT in the last 72 hours. No lab exists for component: INREXT   Recent Labs      05/30/18   0028   TIBC  274   PSAT  20   FERR  122      No results found for: FOL, RBCF   No results for input(s): PH, PCO2, PO2 in the last 72 hours. No results for input(s): CPK, CKMB in the last 72 hours.     No lab exists for component: TROPONINI  No components found for: [place]  Lab Results   Component Value Date/Time    Color YELLOW/STRAW 05/29/2018 12:59 PM    Appearance CLEAR 05/29/2018 12:59 PM    Specific gravity 1.008 05/29/2018 12:59 PM    pH (UA) 7.0 05/29/2018 12:59 PM    Protein 100 (A) 05/29/2018 12:59 PM    Glucose NEGATIVE  05/29/2018 12:59 PM    Ketone NEGATIVE  05/29/2018 12:59 PM    Bilirubin NEGATIVE  05/29/2018 12:59 PM    Urobilinogen 0.2 05/29/2018 12:59 PM    Nitrites NEGATIVE  05/29/2018 12:59 PM    Leukocyte Esterase NEGATIVE  05/29/2018 12:59 PM    Epithelial cells FEW 05/29/2018 12:59 PM    Bacteria NEGATIVE  05/29/2018 12:59 PM    WBC 0-4 05/29/2018 12:59 PM    RBC 0-5 05/29/2018 12:59 PM       MEDICATIONS:  Current Facility-Administered Medications   Medication Dose Route Frequency    iron sucrose (VENOFER) 100 mg iron/5 mL injection 200 mg  200 mg IntraVENous ONCE    cloNIDine HCl (CATAPRES) tablet 0.1 mg  0.1 mg Oral Q4H PRN    amLODIPine (NORVASC) tablet 5 mg  5 mg Oral QHS    insulin glargine (LANTUS) injection 14 Units  14 Units SubCUTAneous DAILY    levothyroxine (SYNTHROID) tablet 150 mcg  150 mcg Oral QHS    sodium chloride (NS) flush 5-10 mL  5-10 mL IntraVENous Q8H    sodium chloride (NS) flush 5-10 mL  5-10 mL IntraVENous PRN    insulin lispro (HUMALOG) injection   SubCUTAneous AC&HS    glucose chewable tablet 16 g  4 Tab Oral PRN    dextrose (D50W) injection syrg 12.5-25 g  12.5-25 g IntraVENous PRN    glucagon (GLUCAGEN) injection 1 mg  1 mg IntraMUSCular PRN    hydrALAZINE (APRESOLINE) 20 mg/mL injection 10 mg  10 mg IntraVENous Q6H PRN    heparin (porcine) injection 5,000 Units  5,000 Units SubCUTAneous Q8H

## 2018-05-30 NOTE — ED NOTES
TRANSFER - OUT REPORT:    Verbal report given to Donna Francis RN (name) on Anupam Pascal  being transferred to [facility] Tele 3261(unit) for routine progression of care       Report consisted of patients Situation, Background, Assessment and   Recommendations(SBAR). Information from the following report(s) SBAR, Kardex, ED Summary, Intake/Output, MAR, Recent Results, Med Rec Status and Cardiac Rhythm NSR was reviewed with the receiving nurse. Lines:   Peripheral IV 05/29/18 Left Antecubital (Active)   Site Assessment Clean, dry, & intact 5/29/2018  5:05 PM   Phlebitis Assessment 0 5/29/2018  5:05 PM   Infiltration Assessment 0 5/29/2018  5:05 PM   Dressing Status New 5/29/2018  5:05 PM   Hub Color/Line Status Pink 5/29/2018  5:05 PM        Opportunity for questions and clarification was provided. Patient transported with:   Tech   Dual RN skin assessment to be completed by receiving RN. Family to accompany patient to floor. Belongings & chart transferred to floor w/ patient.

## 2018-05-30 NOTE — PROGRESS NOTES
Bedside and Verbal shift change report given to Taylor RN (oncoming nurse) by Madelyn Balderrama RN (offgoing nurse). Report included the following information SBAR, Kardex, MAR and Recent Results.

## 2018-05-30 NOTE — PROGRESS NOTES
Patient is very specific about getting her blood sugar checks and insulin management. She insisted  to monitor her blood sugars with her personal blood sugar monitor, which showed 150 this morning. Patient did not wanted to get her blood sugar checked again for morning. Notified Dr. Tommie Pratt at the bedside. Educated patient about allowing us to monitor the bloodsugar while she is in hospital will allow us to keep better control of her blood sugars.

## 2018-05-31 LAB
ALBUMIN SERPL-MCNC: 2.9 G/DL (ref 3.5–5)
ALBUMIN/GLOB SERPL: 0.9 {RATIO} (ref 1.1–2.2)
ALP SERPL-CCNC: 59 U/L (ref 45–117)
ALT SERPL-CCNC: 53 U/L (ref 12–78)
ANA SER QL: NEGATIVE
ANA TITR SER IF: NEGATIVE {TITER}
ANION GAP SERPL CALC-SCNC: 11 MMOL/L (ref 5–15)
APTT PPP: 32.2 SEC (ref 22.1–32)
AST SERPL-CCNC: 28 U/L (ref 15–37)
BASOPHILS # BLD: 0 K/UL (ref 0–0.1)
BASOPHILS NFR BLD: 0 % (ref 0–1)
BILIRUB SERPL-MCNC: 0.3 MG/DL (ref 0.2–1)
BUN SERPL-MCNC: 72 MG/DL (ref 6–20)
BUN/CREAT SERPL: 14 (ref 12–20)
C-ANCA TITR SER IF: NORMAL TITER
CALCIUM SERPL-MCNC: 8.2 MG/DL (ref 8.5–10.1)
CHLORIDE SERPL-SCNC: 94 MMOL/L (ref 97–108)
CK SERPL-CCNC: 215 U/L (ref 26–192)
CO2 SERPL-SCNC: 24 MMOL/L (ref 21–32)
CREAT SERPL-MCNC: 5.07 MG/DL (ref 0.55–1.02)
DIFFERENTIAL METHOD BLD: ABNORMAL
EOSINOPHIL # BLD: 0.2 K/UL (ref 0–0.4)
EOSINOPHIL NFR BLD: 2 % (ref 0–7)
ERYTHROCYTE [DISTWIDTH] IN BLOOD BY AUTOMATED COUNT: 12.5 % (ref 11.5–14.5)
GLOBULIN SER CALC-MCNC: 3.1 G/DL (ref 2–4)
GLUCOSE BLD STRIP.AUTO-MCNC: 128 MG/DL (ref 65–100)
GLUCOSE BLD STRIP.AUTO-MCNC: 142 MG/DL (ref 65–100)
GLUCOSE BLD STRIP.AUTO-MCNC: 187 MG/DL (ref 65–100)
GLUCOSE BLD STRIP.AUTO-MCNC: 73 MG/DL (ref 65–100)
GLUCOSE BLD STRIP.AUTO-MCNC: 74 MG/DL (ref 65–100)
GLUCOSE BLD STRIP.AUTO-MCNC: 77 MG/DL (ref 65–100)
GLUCOSE BLD STRIP.AUTO-MCNC: 78 MG/DL (ref 65–100)
GLUCOSE BLD STRIP.AUTO-MCNC: 80 MG/DL (ref 65–100)
GLUCOSE SERPL-MCNC: 172 MG/DL (ref 65–100)
HCT VFR BLD AUTO: 20.8 % (ref 35–47)
HGB BLD-MCNC: 6.9 G/DL (ref 11.5–16)
IMM GRANULOCYTES # BLD: 0 K/UL (ref 0–0.04)
IMM GRANULOCYTES NFR BLD AUTO: 0 % (ref 0–0.5)
INR PPP: 1 (ref 0.9–1.1)
KAPPA LC FREE SER-MCNC: 69.6 MG/L (ref 3.3–19.4)
KAPPA LC FREE/LAMBDA FREE SER: 1.28 {RATIO} (ref 0.26–1.65)
LAMBDA LC FREE SERPL-MCNC: 54.2 MG/L (ref 5.7–26.3)
LYMPHOCYTES # BLD: 1.5 K/UL (ref 0.8–3.5)
LYMPHOCYTES NFR BLD: 20 % (ref 12–49)
MCH RBC QN AUTO: 31.4 PG (ref 26–34)
MCHC RBC AUTO-ENTMCNC: 33.2 G/DL (ref 30–36.5)
MCV RBC AUTO: 94.5 FL (ref 80–99)
MONOCYTES # BLD: 0.9 K/UL (ref 0–1)
MONOCYTES NFR BLD: 12 % (ref 5–13)
NEUTS SEG # BLD: 5.1 K/UL (ref 1.8–8)
NEUTS SEG NFR BLD: 66 % (ref 32–75)
NRBC # BLD: 0 K/UL (ref 0–0.01)
NRBC BLD-RTO: 0 PER 100 WBC
P-ANCA ATYPICAL TITR SER IF: NORMAL TITER
P-ANCA TITR SER IF: NORMAL TITER
PLATELET # BLD AUTO: 199 K/UL (ref 150–400)
PMV BLD AUTO: 12.4 FL (ref 8.9–12.9)
POTASSIUM SERPL-SCNC: 4.3 MMOL/L (ref 3.5–5.1)
PROT SERPL-MCNC: 6 G/DL (ref 6.4–8.2)
PROTHROMBIN TIME: 10 SEC (ref 9–11.1)
RBC # BLD AUTO: 2.2 M/UL (ref 3.8–5.2)
RBC MORPH BLD: ABNORMAL
SERVICE CMNT-IMP: ABNORMAL
SERVICE CMNT-IMP: NORMAL
SODIUM SERPL-SCNC: 129 MMOL/L (ref 136–145)
THERAPEUTIC RANGE,PTTT: ABNORMAL SECS (ref 58–77)
WBC # BLD AUTO: 7.7 K/UL (ref 3.6–11)

## 2018-05-31 PROCEDURE — 74011636637 HC RX REV CODE- 636/637: Performed by: INTERNAL MEDICINE

## 2018-05-31 PROCEDURE — 36415 COLL VENOUS BLD VENIPUNCTURE: CPT | Performed by: GENERAL ACUTE CARE HOSPITAL

## 2018-05-31 PROCEDURE — P9016 RBC LEUKOCYTES REDUCED: HCPCS | Performed by: GENERAL ACUTE CARE HOSPITAL

## 2018-05-31 PROCEDURE — 93306 TTE W/DOPPLER COMPLETE: CPT

## 2018-05-31 PROCEDURE — 85730 THROMBOPLASTIN TIME PARTIAL: CPT | Performed by: INTERNAL MEDICINE

## 2018-05-31 PROCEDURE — 85610 PROTHROMBIN TIME: CPT | Performed by: INTERNAL MEDICINE

## 2018-05-31 PROCEDURE — 82962 GLUCOSE BLOOD TEST: CPT

## 2018-05-31 PROCEDURE — 74011250637 HC RX REV CODE- 250/637: Performed by: INTERNAL MEDICINE

## 2018-05-31 PROCEDURE — 85025 COMPLETE CBC W/AUTO DIFF WBC: CPT | Performed by: GENERAL ACUTE CARE HOSPITAL

## 2018-05-31 PROCEDURE — 36430 TRANSFUSION BLD/BLD COMPNT: CPT

## 2018-05-31 PROCEDURE — 65660000000 HC RM CCU STEPDOWN

## 2018-05-31 PROCEDURE — 82550 ASSAY OF CK (CPK): CPT | Performed by: GENERAL ACUTE CARE HOSPITAL

## 2018-05-31 PROCEDURE — 30233N1 TRANSFUSION OF NONAUTOLOGOUS RED BLOOD CELLS INTO PERIPHERAL VEIN, PERCUTANEOUS APPROACH: ICD-10-PCS | Performed by: GENERAL ACUTE CARE HOSPITAL

## 2018-05-31 PROCEDURE — 80053 COMPREHEN METABOLIC PANEL: CPT | Performed by: GENERAL ACUTE CARE HOSPITAL

## 2018-05-31 PROCEDURE — 74011000250 HC RX REV CODE- 250: Performed by: INTERNAL MEDICINE

## 2018-05-31 PROCEDURE — 86900 BLOOD TYPING SEROLOGIC ABO: CPT | Performed by: GENERAL ACUTE CARE HOSPITAL

## 2018-05-31 PROCEDURE — 86923 COMPATIBILITY TEST ELECTRIC: CPT | Performed by: GENERAL ACUTE CARE HOSPITAL

## 2018-05-31 RX ORDER — BUMETANIDE 0.25 MG/ML
2 INJECTION INTRAMUSCULAR; INTRAVENOUS ONCE
Status: COMPLETED | OUTPATIENT
Start: 2018-05-31 | End: 2018-05-31

## 2018-05-31 RX ORDER — SODIUM CHLORIDE 9 MG/ML
250 INJECTION, SOLUTION INTRAVENOUS AS NEEDED
Status: DISCONTINUED | OUTPATIENT
Start: 2018-05-31 | End: 2018-06-02 | Stop reason: HOSPADM

## 2018-05-31 RX ORDER — ACETAMINOPHEN 325 MG/1
650 TABLET ORAL
Status: DISCONTINUED | OUTPATIENT
Start: 2018-05-31 | End: 2018-06-02 | Stop reason: HOSPADM

## 2018-05-31 RX ORDER — DIPHENHYDRAMINE HCL 25 MG
25 CAPSULE ORAL
Status: DISCONTINUED | OUTPATIENT
Start: 2018-05-31 | End: 2018-06-02 | Stop reason: HOSPADM

## 2018-05-31 RX ORDER — INSULIN LISPRO 100 [IU]/ML
INJECTION, SOLUTION INTRAVENOUS; SUBCUTANEOUS
Status: DISCONTINUED | OUTPATIENT
Start: 2018-05-31 | End: 2018-06-02 | Stop reason: HOSPADM

## 2018-05-31 RX ORDER — INSULIN GLARGINE 100 [IU]/ML
12 INJECTION, SOLUTION SUBCUTANEOUS DAILY
Status: DISCONTINUED | OUTPATIENT
Start: 2018-06-01 | End: 2018-06-01

## 2018-05-31 RX ADMIN — Medication 10 ML: at 14:00

## 2018-05-31 RX ADMIN — Medication 10 ML: at 22:58

## 2018-05-31 RX ADMIN — Medication 10 ML: at 05:48

## 2018-05-31 RX ADMIN — INSULIN GLARGINE 14 UNITS: 100 INJECTION, SOLUTION SUBCUTANEOUS at 05:47

## 2018-05-31 RX ADMIN — LEVOTHYROXINE SODIUM 150 MCG: 150 TABLET ORAL at 22:56

## 2018-05-31 RX ADMIN — AMLODIPINE BESYLATE 5 MG: 5 TABLET ORAL at 22:56

## 2018-05-31 RX ADMIN — BUMETANIDE 2 MG: 0.25 INJECTION INTRAMUSCULAR; INTRAVENOUS at 14:03

## 2018-05-31 NOTE — PROGRESS NOTES
NAME: Max Ramsay        :  1945        MRN:  [id number]        Assessment :    Plan:  --CKD-3  MELISSA  Nephrotic range proteinuria  Anemia  Hyperphosphatemia  Increased LFT\"s   Abdominal discomfort  Weight loss  Hyponatremia  DM - type I  NSAID use  Secondary hyperparathyroidism --Creatinine about the same. Fair UO. UACR over 3000. Could be from diabetes or another glomerular disorder. NSAID's could play a role, but she says that she wasn't taking them often. Hep B and C are negative. NOLAN, ANCA, VÍCTOR and light chain ratio are pending. U/S shows echogenic kidneys suggesting some chronicity. I had a discussion with the patient, her , her daughter, and Dr. Luis Gutierrez. I told them that I recommend renal biopsy tomorrow so we can define the cause or her MELISSA. Hopefully we will find something treatable. Will stop heparin. Check coags today and make NPO after midnight. Will give DDAVP prior to biopsy tommorow to reverse any uremic thrombopathy. Described biopsy procedure to patient and she and family are agreeable to proceed. Dr. Luis Gutierrez also agrees. Sodium is better now that she has been restricting her fluid intake. She is anemic and her iron parameters are on the low side. I gave her a dose of IV iron. She may end up needing an VANITA. I discussed transfusion with her and family today. With her hemoglobin declining and in face of invasive procedure tomorrow I recommended that she get a unit of PRBC's today. She and her family are also in agreement with this. Continue off of ARB and diuretic. Clonidine PRN. Low calcium with elevated phos and elevated PTH suggest secondary hyperparathyroidism. Subjective:     Chief Complaint:  \"I've been decreasing my water intake. \"  Feels the same.        Review of Systems:    Symptom Y/N Comments  Symptom Y/N Comments   Fever/Chills    Chest Pain     Poor Appetite    Edema Cough    Abdominal Pain     Sputum    Joint Pain     SOB/COPPOLA    Pruritis/Rash     Nausea/vomit    Tolerating PT/OT     Diarrhea    Tolerating Diet     Constipation    Other       Could not obtain due to:      Objective:     VITALS:   Last 24hrs VS reviewed since prior progress note. Most recent are:  Visit Vitals    /73    Pulse 71    Temp 97.9 °F (36.6 °C)    Resp 16    Ht 5' 4\" (1.626 m)    Wt 56.2 kg (123 lb 14.4 oz)    SpO2 97%    BMI 21.27 kg/m2       Intake/Output Summary (Last 24 hours) at 05/31/18 1307  Last data filed at 05/31/18 0556   Gross per 24 hour   Intake              449 ml   Output                0 ml   Net              449 ml      Telemetry Reviewed:     PHYSICAL EXAM:  General: NAD  Trace ankle edema      Lab Data Reviewed: (see below)    Medications Reviewed: (see below)    PMH/SH reviewed - no change compared to H&P  ________________________________________________________________________  Care Plan discussed with:  Patient y    Family  y  and dtr   RN     Care Manager                    Consultant:          Comments   >50% of visit spent in counseling and coordination of care y I spent a total of 35 minutes, more than 1/2 of which was spent discussing the above.     ________________________________________________________________________  Lizet Francis MD     Procedures: see electronic medical records for all procedures/Xrays and details which  were not copied into this note but were reviewed prior to creation of Plan.       LABS:  Recent Labs      05/31/18   0540  05/30/18   0028   WBC  7.7  8.6   HGB  6.9*  7.2*   HCT  20.8*  21.6*   PLT  199  209     Recent Labs      05/31/18   0540  05/30/18   0028  05/29/18   1238   NA  129*  125*  126*   K  4.3  4.1  4.5   CL  94*  91*  91*   CO2  24  23  22   BUN  72*  74*  76*   CREA  5.07*  4.96*  5.13*   GLU  172*  100  94   CA  8.2*  8.2*  9.0  9.3   MG   --   2.1  2.3   PHOS   --   5.4*  5.1*     Recent Labs 05/31/18   0540  05/29/18   1238   SGOT  28  41*   AP  59  72   TP  6.0*  7.3   ALB  2.9*  3.6   GLOB  3.1  3.7     No results for input(s): INR, PTP, APTT in the last 72 hours. No lab exists for component: INREXT, INREXT   Recent Labs      05/30/18   0028   TIBC  274   PSAT  20   FERR  122      No results found for: FOL, RBCF   No results for input(s): PH, PCO2, PO2 in the last 72 hours.   Recent Labs      05/31/18   0540   CPK  215*     No components found for: [place]  Lab Results   Component Value Date/Time    Color YELLOW/STRAW 05/29/2018 12:59 PM    Appearance CLEAR 05/29/2018 12:59 PM    Specific gravity 1.008 05/29/2018 12:59 PM    pH (UA) 7.0 05/29/2018 12:59 PM    Protein 100 (A) 05/29/2018 12:59 PM    Glucose NEGATIVE  05/29/2018 12:59 PM    Ketone NEGATIVE  05/29/2018 12:59 PM    Bilirubin NEGATIVE  05/29/2018 12:59 PM    Urobilinogen 0.2 05/29/2018 12:59 PM    Nitrites NEGATIVE  05/29/2018 12:59 PM    Leukocyte Esterase NEGATIVE  05/29/2018 12:59 PM    Epithelial cells FEW 05/29/2018 12:59 PM    Bacteria NEGATIVE  05/29/2018 12:59 PM    WBC 0-4 05/29/2018 12:59 PM    RBC 0-5 05/29/2018 12:59 PM       MEDICATIONS:  Current Facility-Administered Medications   Medication Dose Route Frequency    insulin lispro (HUMALOG) injection   SubCUTAneous AC&HS    0.9% sodium chloride infusion 250 mL  250 mL IntraVENous PRN    [START ON 6/1/2018] insulin glargine (LANTUS) injection 12 Units  12 Units SubCUTAneous DAILY    0.9% sodium chloride infusion 250 mL  250 mL IntraVENous PRN    bumetanide (BUMEX) injection 2 mg  2 mg IntraVENous ONCE    acetaminophen (TYLENOL) tablet 650 mg  650 mg Oral Q4H PRN    diphenhydrAMINE (BENADRYL) capsule 25 mg  25 mg Oral Q6H PRN    [START ON 6/1/2018] desmopressin (DDAVP) 16.88 mcg in 0.9% sodium chloride 50 mL IVPB  0.3 mcg/kg IntraVENous ONCE    cloNIDine HCl (CATAPRES) tablet 0.1 mg  0.1 mg Oral Q4H PRN    amLODIPine (NORVASC) tablet 5 mg  5 mg Oral QHS    levothyroxine (SYNTHROID) tablet 150 mcg  150 mcg Oral QHS    sodium chloride (NS) flush 5-10 mL  5-10 mL IntraVENous Q8H    sodium chloride (NS) flush 5-10 mL  5-10 mL IntraVENous PRN    glucose chewable tablet 16 g  4 Tab Oral PRN    dextrose (D50W) injection syrg 12.5-25 g  12.5-25 g IntraVENous PRN    glucagon (GLUCAGEN) injection 1 mg  1 mg IntraMUSCular PRN    hydrALAZINE (APRESOLINE) 20 mg/mL injection 10 mg  10 mg IntraVENous Q6H PRN

## 2018-05-31 NOTE — PROGRESS NOTES
HYPOGLYCEMIC EPISODE DOCUMENTATION    Patient with hypoglycemic episode(s) at 1136(time) on 05/31(date). BG value(s) pre-treatment 66    Was patient symptomatic? [] yes, [x] no  Patient was treated with the following rescue medications/treatments: [] D50                [] Glucose tablets                [] Glucagon                [] 4oz juice                [] 6oz reg soda                [] 8oz low fat milk  Pt manages her own T1DM, chose to treat with her own jellybean/candy. BG value post-treatment: 74 at 1158, pt is eating more jellybeans/candy. Primary nurse notified Recheck at [phone] BG is 80.     Once BG treated and value greater than 80mg/dl, pt was provided with the following:  [] snack  [x] meal  Name of MD notified:None  The following orders were received: None

## 2018-05-31 NOTE — PROGRESS NOTES
0980  Paged Dr. Jhony Adamson to obtain blood consent. He will come to bedside shortly. [id number]  Dr Jhony Adamson at bedside    3517  Dr Ugo Green at bedside. Paged Dr. Jhony Adamson to meet with him. 700 [facility]  Dr. Jhony Adamson and Dr. Jaqueline Bush both placed orders for transfuse PRBCs. I called Dr. Jhony Adamson to confirm, we are only transfusing pt with 1 unit PRBCs at this time. 63 [facility] Dr. Jaqueline Bush regarding orders for PT/INR and PTT. Pt agrees to get lab draws in order to prevent possible delay in biopsy. If labs are drawn tomorrow morning and come back abnormal, procedure could potentially be delayed.

## 2018-05-31 NOTE — CONSULTS
I spoke at length with family this morning. Results of evaluation so far noted and thus far non-diagnostic. I will discuss the option of renal biopsy with Dr Pedro Luis Portillo. I believe this is the next best step. Full note to follow.

## 2018-05-31 NOTE — PROGRESS NOTES
Bedside and Verbal shift change report given to Roxanne Cuevas (oncoming nurse) by Ventura Rhodes (offgoing nurse). Report included the following information SBAR, Kardex, Intake/Output, MAR, Accordion and Recent Results. NPO at midnight for kidney biopsy tomorrow. Transfused 1 unit PRBCs, tolerated well. No complaints of pain. Please see MAR in regards to pt self administration of meal time insulin coverage.

## 2018-05-31 NOTE — PROGRESS NOTES
Bedside and Verbal shift change report given to Radha Kirkpatrick (oncoming nurse) by Basil Hernandez RN (offgoing nurse). Report included the following information SBAR, Kardex, MAR and Recent Results.

## 2018-05-31 NOTE — PROGRESS NOTES
Hospitalist Progress Note    NAME: Anne Devine   :  1945   MRN:  [id number]       Assessment / Plan:  MELISSA on CKD 3  DMI, 52 year history  Nephrotic proteinura  Anemia  Secondary Hyperparathyroidism  Hyponatremia  -Continue tele admission  -Nephrology help appreciated - still concerned about acute component in regards to her sudden worsening renal function  -Na improving, fluid restriction - pt states that she drinks \"a lot\"  -Pt with very specific requests in regards to her DM care - appreciate Endo note. Advised her and her family that her FS control is still acceptable and at the moment the greater concern lies with her kidney function.   -Pt also refusing Heparin and wants to ambulate and \"excercise\" have told her to take Heparin or SCDs at least and that she can ambulate the hallways but just do it in the evening when it is less busy  -PTH, Ca and Phos levels indicate secondary hyperpara  -H/H noted, continue to monitor    :  Hgb 6.9 - pt should receive 1 unit pRBC. However she wants to first discuss and clear this with Dr. Jude Cervantes. Spoke with pt's daughter in private and she states that her mother and father do not fully understand the situation and \"in denial.\" Advised the family that I think she should get 1 unit, and use Heparin for DVT prophylaxis. Up to the patient and whatever her discussions with Dr. Jude Cervantes are if she wishes to follow them. In regards to her renal function, it has not improved and repeat this morning shows a slight worsening. It appears Nephro plan is headed towards biopsy which I would agree with. Na improving with fluid restriction. 18.5 - 24.9 Normal weight  Body mass index is 21.27 kg/(m^2). Code status: Full  Prophylaxis: Hep SQ  Recommended Disposition: Home w/Family     Subjective:     Chief Complaint / Reason for Physician Visit  Spoke at length with pt and her family at bedside. Pt will not do anything unless first cleared by Dr. Jude Cervantes it seems.   Discussed with RN events overnight. Review of Systems:  Symptom Y/N Comments  Symptom Y/N Comments   Fever/Chills n   Chest Pain n    Poor Appetite    Edema     Cough n   Abdominal Pain n    Sputum    Joint Pain     SOB/COPPOLA n   Pruritis/Rash     Nausea/vomit    Tolerating PT/OT     Diarrhea    Tolerating Diet     Constipation    Other       Could NOT obtain due to:      Objective:     VITALS:   Last 24hrs VS reviewed since prior progress note. Most recent are:  Patient Vitals for the past 24 hrs:   Temp Pulse Resp BP SpO2   05/31/18 0751 97.9 °F (36.6 °C) 71 16 156/73 97 %   05/30/18 2233 97.9 °F (36.6 °C) 74 16 158/77 98 %   05/30/18 1856 - 81 - 173/76 -   05/30/18 1510 98 °F (36.7 °C) 73 17 177/66 98 %       Intake/Output Summary (Last 24 hours) at 05/31/18 1016  Last data filed at 05/31/18 0556   Gross per 24 hour   Intake              449 ml   Output                0 ml   Net              449 ml        PHYSICAL EXAM:  General: WD, WN. Alert, cooperative, no acute distress    EENT:  EOMI. Anicteric sclerae. MMM  Resp:  CTA bilaterally, no wheezing or rales. No accessory muscle use  CV:  Regular  rhythm,  No edema  GI:  Soft, Non distended, Non tender.  +Bowel sounds  Neurologic:  Alert and oriented X 3, normal speech   Psych:   Good insight. +anxious  Skin:  No rashes. No jaundice    Reviewed most current lab test results and cultures  YES  Reviewed most current radiology test results   YES  Review and summation of old records today    NO  Reviewed patient's current orders and MAR    YES  PMH/SH reviewed - no change compared to H&P  ________________________________________________________________________  Care Plan discussed with:    Comments   Patient x    Family  x    RN x    Care Manager     Consultant                        Multidiciplinary team rounds were held today with , nursing, pharmacist and clinical coordinator.   Patient's plan of care was discussed; medications were reviewed and discharge planning was addressed. ________________________________________________________________________  Total NON critical care TIME:  35   Minutes    Total CRITICAL CARE TIME Spent:   Minutes non procedure based      Comments   >50% of visit spent in counseling and coordination of care     ________________________________________________________________________  Vivek Baptiste MD     Procedures: see electronic medical records for all procedures/Xrays and details which were not copied into this note but were reviewed prior to creation of Plan. LABS:  I reviewed today's most current labs and imaging studies.   Pertinent labs include:  Recent Labs      05/31/18   0540  05/30/18   0028  05/29/18   1238   WBC  7.7  8.6  10.1   HGB  6.9*  7.2*  8.5*   HCT  20.8*  21.6*  25.4*   PLT  199  209  246     Recent Labs      05/31/18 0540  05/30/18   0028  05/29/18   1238   NA  129*  125*  126*   K  4.3  4.1  4.5   CL  94*  91*  91*   CO2  24  23  22   GLU  172*  100  94   BUN  72*  74*  76*   CREA  5.07*  4.96*  5.13*   CA  8.2*  8.2*  9.0  9.3   MG   --   2.1  2.3   PHOS   --   5.4*  5.1*   ALB  2.9*   --   3.6   TBILI  0.3   --   0.5   SGOT  28   --   41*   ALT  53   --   70       Signed: Vivek Baptiste MD

## 2018-05-31 NOTE — CONSULTS
Ms. Fransico Essex was seen today in follow-up of her type 1 diabetes mellitus and acute on chronic renal failure. The events of the last 24 hours are noted. Laboratory tests including her NOLAN are pending at the time of this note. All of the tests have thus far been nondiagnostic. Fluid restriction is resulting in improvement in her hyponatremia her hemoglobin has now fallen below 7. She did receive an iron infusion yesterday. .      The patient has had many questions about her treatment and treatment options. In consultation with Dr. Tara Jo, we are all in agreement that renal biopsy is most appropriate at this time to identify a reversible cause if one is to be found. She has been concerned about the possibility of a transfusion but it was described to her today that the transfusion would be necessary to preclude a significant drop in hemoglobin following biopsy. She is in agreement with this. Examination  Blood pressure 156/73  Pulse 71  Afebrile    Impression type 1 diabetes mellitus and acute on chronic renal insufficiency. Recommendations: We are in agreement with the CT-guided biopsy in the morning and transfusion today in anticipation of the biopsy. Regarding her diabetes, the renal insufficiency has clearly altered her insulin clearance and I have decreased her insulin to 12 units and perhaps as much as 10 if needed but for now she will receive 12 units. She will continue to receive her mealtime insulin according to her blood sugar and her carbohydrate intake. I did discuss this with her nurses and Dr. Grace Chun who are all in agreement. I will continue to follow closely. I appreciate very much everyone's effort on behalf of this wonderful lady and her family.

## 2018-06-01 ENCOUNTER — APPOINTMENT (OUTPATIENT)
Dept: CT IMAGING | Age: 73
DRG: 683 | End: 2018-06-01
Attending: INTERNAL MEDICINE
Payer: MEDICARE

## 2018-06-01 LAB
ANION GAP SERPL CALC-SCNC: 13 MMOL/L (ref 5–15)
BASOPHILS # BLD: 0 K/UL (ref 0–0.1)
BASOPHILS NFR BLD: 0 % (ref 0–1)
BUN SERPL-MCNC: 85 MG/DL (ref 6–20)
BUN/CREAT SERPL: 16 (ref 12–20)
CALCIUM SERPL-MCNC: 8.6 MG/DL (ref 8.5–10.1)
CHLORIDE SERPL-SCNC: 96 MMOL/L (ref 97–108)
CO2 SERPL-SCNC: 23 MMOL/L (ref 21–32)
CREAT SERPL-MCNC: 5.23 MG/DL (ref 0.55–1.02)
DIFFERENTIAL METHOD BLD: ABNORMAL
EOSINOPHIL # BLD: 0.2 K/UL (ref 0–0.4)
EOSINOPHIL NFR BLD: 2 % (ref 0–7)
ERYTHROCYTE [DISTWIDTH] IN BLOOD BY AUTOMATED COUNT: 14.5 % (ref 11.5–14.5)
GLUCOSE BLD STRIP.AUTO-MCNC: 112 MG/DL (ref 65–100)
GLUCOSE BLD STRIP.AUTO-MCNC: 125 MG/DL (ref 65–100)
GLUCOSE BLD STRIP.AUTO-MCNC: 135 MG/DL (ref 65–100)
GLUCOSE BLD STRIP.AUTO-MCNC: 199 MG/DL (ref 65–100)
GLUCOSE BLD STRIP.AUTO-MCNC: 44 MG/DL (ref 65–100)
GLUCOSE BLD STRIP.AUTO-MCNC: 49 MG/DL (ref 65–100)
GLUCOSE BLD STRIP.AUTO-MCNC: 49 MG/DL (ref 65–100)
GLUCOSE BLD STRIP.AUTO-MCNC: 52 MG/DL (ref 65–100)
GLUCOSE BLD STRIP.AUTO-MCNC: 55 MG/DL (ref 65–100)
GLUCOSE BLD STRIP.AUTO-MCNC: 57 MG/DL (ref 65–100)
GLUCOSE BLD STRIP.AUTO-MCNC: 64 MG/DL (ref 65–100)
GLUCOSE BLD STRIP.AUTO-MCNC: 72 MG/DL (ref 65–100)
GLUCOSE BLD STRIP.AUTO-MCNC: 97 MG/DL (ref 65–100)
GLUCOSE SERPL-MCNC: 82 MG/DL (ref 65–100)
HCT VFR BLD AUTO: 21.2 % (ref 35–47)
HCT VFR BLD AUTO: 25.3 % (ref 35–47)
HGB BLD-MCNC: 7 G/DL (ref 11.5–16)
HGB BLD-MCNC: 8.6 G/DL (ref 11.5–16)
IGA SERPL-MCNC: 125 MG/DL (ref 87–352)
IGG SERPL-MCNC: 660 MG/DL (ref 700–1600)
IGM SERPL-MCNC: 143 MG/DL (ref 26–217)
IMM GRANULOCYTES # BLD: 0 K/UL (ref 0–0.04)
IMM GRANULOCYTES NFR BLD AUTO: 0 % (ref 0–0.5)
LYMPHOCYTES # BLD: 1.5 K/UL (ref 0.8–3.5)
LYMPHOCYTES NFR BLD: 15 % (ref 12–49)
MCH RBC QN AUTO: 30.9 PG (ref 26–34)
MCHC RBC AUTO-ENTMCNC: 34 G/DL (ref 30–36.5)
MCV RBC AUTO: 91 FL (ref 80–99)
MONOCYTES # BLD: 1.3 K/UL (ref 0–1)
MONOCYTES NFR BLD: 14 % (ref 5–13)
NEUTS SEG # BLD: 6.8 K/UL (ref 1.8–8)
NEUTS SEG NFR BLD: 69 % (ref 32–75)
NRBC # BLD: 0 K/UL (ref 0–0.01)
NRBC BLD-RTO: 0 PER 100 WBC
PLATELET # BLD AUTO: 216 K/UL (ref 150–400)
PMV BLD AUTO: 12.1 FL (ref 8.9–12.9)
POTASSIUM SERPL-SCNC: 5.2 MMOL/L (ref 3.5–5.1)
PROT PATTERN SERPL IFE-IMP: ABNORMAL
RBC # BLD AUTO: 2.78 M/UL (ref 3.8–5.2)
SERVICE CMNT-IMP: ABNORMAL
SERVICE CMNT-IMP: NORMAL
SERVICE CMNT-IMP: NORMAL
SODIUM SERPL-SCNC: 132 MMOL/L (ref 136–145)
WBC # BLD AUTO: 9.8 K/UL (ref 3.6–11)

## 2018-06-01 PROCEDURE — 74011250637 HC RX REV CODE- 250/637: Performed by: INTERNAL MEDICINE

## 2018-06-01 PROCEDURE — 74011636637 HC RX REV CODE- 636/637: Performed by: INTERNAL MEDICINE

## 2018-06-01 PROCEDURE — 85018 HEMOGLOBIN: CPT | Performed by: INTERNAL MEDICINE

## 2018-06-01 PROCEDURE — 80048 BASIC METABOLIC PNL TOTAL CA: CPT | Performed by: GENERAL ACUTE CARE HOSPITAL

## 2018-06-01 PROCEDURE — 0TB13ZX EXCISION OF LEFT KIDNEY, PERCUTANEOUS APPROACH, DIAGNOSTIC: ICD-10-PCS | Performed by: RADIOLOGY

## 2018-06-01 PROCEDURE — 74011000250 HC RX REV CODE- 250

## 2018-06-01 PROCEDURE — 74011250636 HC RX REV CODE- 250/636: Performed by: GENERAL ACUTE CARE HOSPITAL

## 2018-06-01 PROCEDURE — 88350 IMFLUOR EA ADDL 1ANTB STN PX: CPT | Performed by: INTERNAL MEDICINE

## 2018-06-01 PROCEDURE — 85025 COMPLETE CBC W/AUTO DIFF WBC: CPT | Performed by: GENERAL ACUTE CARE HOSPITAL

## 2018-06-01 PROCEDURE — 65660000000 HC RM CCU STEPDOWN

## 2018-06-01 PROCEDURE — 77012 CT SCAN FOR NEEDLE BIOPSY: CPT

## 2018-06-01 PROCEDURE — 74011000258 HC RX REV CODE- 258: Performed by: INTERNAL MEDICINE

## 2018-06-01 PROCEDURE — 74011250636 HC RX REV CODE- 250/636: Performed by: RADIOLOGY

## 2018-06-01 PROCEDURE — 88313 SPECIAL STAINS GROUP 2: CPT | Performed by: INTERNAL MEDICINE

## 2018-06-01 PROCEDURE — 36415 COLL VENOUS BLD VENIPUNCTURE: CPT | Performed by: GENERAL ACUTE CARE HOSPITAL

## 2018-06-01 PROCEDURE — 74011000250 HC RX REV CODE- 250: Performed by: INTERNAL MEDICINE

## 2018-06-01 PROCEDURE — 88346 IMFLUOR 1ST 1ANTB STAIN PX: CPT | Performed by: INTERNAL MEDICINE

## 2018-06-01 PROCEDURE — 88305 TISSUE EXAM BY PATHOLOGIST: CPT | Performed by: INTERNAL MEDICINE

## 2018-06-01 PROCEDURE — 88348 ELECTRON MICROSCOPY DX: CPT | Performed by: INTERNAL MEDICINE

## 2018-06-01 PROCEDURE — 74011250636 HC RX REV CODE- 250/636: Performed by: INTERNAL MEDICINE

## 2018-06-01 PROCEDURE — 82962 GLUCOSE BLOOD TEST: CPT

## 2018-06-01 PROCEDURE — 74011000258 HC RX REV CODE- 258: Performed by: GENERAL ACUTE CARE HOSPITAL

## 2018-06-01 PROCEDURE — 74011250637 HC RX REV CODE- 250/637: Performed by: GENERAL ACUTE CARE HOSPITAL

## 2018-06-01 RX ORDER — FENTANYL CITRATE 50 UG/ML
100 INJECTION, SOLUTION INTRAMUSCULAR; INTRAVENOUS
Status: DISCONTINUED | OUTPATIENT
Start: 2018-06-01 | End: 2018-06-01

## 2018-06-01 RX ORDER — DEXTROSE MONOHYDRATE AND SODIUM CHLORIDE 5; .9 G/100ML; G/100ML
100 INJECTION, SOLUTION INTRAVENOUS CONTINUOUS
Status: DISCONTINUED | OUTPATIENT
Start: 2018-06-01 | End: 2018-06-02

## 2018-06-01 RX ORDER — SODIUM CHLORIDE 9 MG/ML
25 INJECTION, SOLUTION INTRAVENOUS CONTINUOUS
Status: DISCONTINUED | OUTPATIENT
Start: 2018-06-01 | End: 2018-06-01

## 2018-06-01 RX ORDER — LIDOCAINE HYDROCHLORIDE AND EPINEPHRINE 10; 10 MG/ML; UG/ML
INJECTION, SOLUTION INFILTRATION; PERINEURAL
Status: COMPLETED
Start: 2018-06-01 | End: 2018-06-01

## 2018-06-01 RX ORDER — INSULIN GLARGINE 100 [IU]/ML
10 INJECTION, SOLUTION SUBCUTANEOUS DAILY
Status: DISCONTINUED | OUTPATIENT
Start: 2018-06-02 | End: 2018-06-02 | Stop reason: HOSPADM

## 2018-06-01 RX ORDER — MIDAZOLAM HYDROCHLORIDE 1 MG/ML
5 INJECTION, SOLUTION INTRAMUSCULAR; INTRAVENOUS
Status: DISCONTINUED | OUTPATIENT
Start: 2018-06-01 | End: 2018-06-01

## 2018-06-01 RX ORDER — AMLODIPINE BESYLATE 5 MG/1
10 TABLET ORAL
Status: DISCONTINUED | OUTPATIENT
Start: 2018-06-01 | End: 2018-06-02

## 2018-06-01 RX ORDER — MORPHINE SULFATE 4 MG/ML
1 INJECTION INTRAVENOUS
Status: DISCONTINUED | OUTPATIENT
Start: 2018-06-01 | End: 2018-06-02 | Stop reason: HOSPADM

## 2018-06-01 RX ADMIN — LIDOCAINE HYDROCHLORIDE,EPINEPHRINE BITARTRATE 10 ML: 10; .01 INJECTION, SOLUTION INFILTRATION; PERINEURAL at 14:08

## 2018-06-01 RX ADMIN — Medication 10 ML: at 21:52

## 2018-06-01 RX ADMIN — CLONIDINE HYDROCHLORIDE 0.1 MG: 0.1 TABLET ORAL at 13:06

## 2018-06-01 RX ADMIN — Medication 10 ML: at 17:08

## 2018-06-01 RX ADMIN — DEXTROSE MONOHYDRATE 12.5 G: 25 INJECTION, SOLUTION INTRAVENOUS at 00:34

## 2018-06-01 RX ADMIN — Medication 16 G: at 10:26

## 2018-06-01 RX ADMIN — DESMOPRESSIN ACETATE 16.88 MCG: 4 INJECTION INTRAVENOUS at 09:51

## 2018-06-01 RX ADMIN — DEXTROSE MONOHYDRATE 25 G: 25 INJECTION, SOLUTION INTRAVENOUS at 04:17

## 2018-06-01 RX ADMIN — LEVOTHYROXINE SODIUM 150 MCG: 150 TABLET ORAL at 21:48

## 2018-06-01 RX ADMIN — MIDAZOLAM 1 MG: 1 INJECTION INTRAMUSCULAR; INTRAVENOUS at 14:15

## 2018-06-01 RX ADMIN — INSULIN GLARGINE 12 UNITS: 100 INJECTION, SOLUTION SUBCUTANEOUS at 06:16

## 2018-06-01 RX ADMIN — FENTANYL CITRATE 25 MCG: 50 INJECTION, SOLUTION INTRAMUSCULAR; INTRAVENOUS at 14:15

## 2018-06-01 RX ADMIN — AMLODIPINE BESYLATE 5 MG: 5 TABLET ORAL at 21:48

## 2018-06-01 RX ADMIN — MORPHINE SULFATE 1 MG: 4 INJECTION INTRAVENOUS at 17:09

## 2018-06-01 RX ADMIN — MIDAZOLAM 1 MG: 1 INJECTION INTRAMUSCULAR; INTRAVENOUS at 14:18

## 2018-06-01 RX ADMIN — FENTANYL CITRATE 25 MCG: 50 INJECTION, SOLUTION INTRAMUSCULAR; INTRAVENOUS at 14:18

## 2018-06-01 RX ADMIN — DEXTROSE MONOHYDRATE AND SODIUM CHLORIDE 100 ML/HR: 5; .9 INJECTION, SOLUTION INTRAVENOUS at 17:12

## 2018-06-01 RX ADMIN — Medication 16 G: at 18:45

## 2018-06-01 RX ADMIN — Medication 10 ML: at 06:17

## 2018-06-01 RX ADMIN — SODIUM CHLORIDE 25 ML/HR: 900 INJECTION, SOLUTION INTRAVENOUS at 14:15

## 2018-06-01 NOTE — PROGRESS NOTES
Paged MD x 4th attempt. Contacted pharmacy regarding DDAVP. Per pharmacist, due to delays in procedure 1000 dose won't be effective if procedure is done today. Information relayed to Dr. Larissa Alexis. Procedure is cancelled today and PO clonidine will be given.

## 2018-06-01 NOTE — PROGRESS NOTES
HYPOGLYCEMIC EPISODE DOCUMENTATION     Patient with hypoglycemic episode(s) at (time) on 06/01(date). BG value(s) pre-treatment 52    Was patient symptomatic? [] yes, [x] no  Patient was treated with the following rescue medications/treatments: [] D50                [x] Glucose tablets                [] Glucagon                [] 4oz juice                 [] 6oz reg soda  [] 8oz low fat milk    Pt had to get D50 last night and it was very painful through IV. She is also getting medication through IV to prepare for procedure. Pt requested glucose tablets. x4 tablets given. BG value post-treatment: 64   Pt requesting primary nurse to re-check in 5 more minutes. If still low pt will accept IM glucagon  Pt requesting another 5 minutes re-check at 1112 BG was 97.   Once BG treated and value greater than 80mg/dl, pt was provided with the following:  [] snack  [] meal  Name of MD notified:None  The following orders were received: None

## 2018-06-01 NOTE — PROGRESS NOTES
NAME: Bosie Fleischer        :  1945        MRN:  [id number]        Assessment :    Plan:  --CKD-3  MELISSA  Nephrotic range proteinuria  Anemia  Hyperphosphatemia  Increased LFT\"s   Abdominal discomfort  Weight loss  Hyponatremia  DM - type I  NSAID use  Secondary hyperparathyroidism --Creatinine creeping up. Fair UO. UACR over 3000. Could be from diabetes or another glomerular disorder. NSAID's could play a role, but she says that she wasn't taking them often. Hep B and C are negative. NOLAN, ANCA, I and light chain ratio are normal.  VÍCTOR pending. U/S shows echogenic kidneys suggesting some chronicity. Plan for biopsy this AM.  To get DDAVP. Sodium continues to improve with fluid restriction. I gave her IV iron as her sat was low. S/P one unit PRBC's . H/H increased appropriately. Continue off of ARB and diuretic. Clonidine PRN. Low calcium with elevated phos and elevated PTH suggest secondary hyperparathyroidism. Potassium mildly elevated today. When I resume diet, I will place on a potassium restricted diet. Addendum:  Biopsy put on hold due to elevated BP. Pt received clonidine and I rechecked her manually and SBP was 142. DDAVP was given earlier this AM.  I told the patient that there can be increased risk of bleeding so we could put the biopsy off until Monday or proceed today. She very much wants to proceed today. Radiology in agreement with proceeding. Addendum: CT nurse reports the patient tolerated biopsy well and that the patient's systolic is now around 342. Post-biopsy orders entered. Subjective:     Chief Complaint:  \"My sugars have been low. \"   Has jelly beans to increase glucose.          Review of Systems:    Symptom Y/N Comments  Symptom Y/N Comments   Fever/Chills    Chest Pain     Poor Appetite    Edema     Cough    Abdominal Pain     Sputum    Joint Pain     SOB/COPPOLA Pruritis/Rash     Nausea/vomit    Tolerating PT/OT     Diarrhea    Tolerating Diet     Constipation    Other       Could not obtain due to:      Objective:     VITALS:   Last 24hrs VS reviewed since prior progress note. Most recent are:  Visit Vitals    /71    Pulse 73    Temp 97.8 °F (36.6 °C)    Resp 18    Ht 5' 4\" (1.626 m)    Wt 56.2 kg (123 lb 14.4 oz)    SpO2 96%    BMI 21.27 kg/m2       Intake/Output Summary (Last 24 hours) at 06/01/18 0954  Last data filed at 06/01/18 0042   Gross per 24 hour   Intake            289.2 ml   Output             2000 ml   Net          -1710.8 ml      Telemetry Reviewed:     PHYSICAL EXAM:  General: NAD  Trace ankle edema      Lab Data Reviewed: (see below)    Medications Reviewed: (see below)    PMH/SH reviewed - no change compared to H&P  ________________________________________________________________________  Care Plan discussed with:  Patient y    Family  y    RN     Care Manager                    Consultant:          Comments   >50% of visit spent in counseling and coordination of care y I spent a total of 35 minutes, more than 1/2 of which was spent discussing the above.     ________________________________________________________________________  Cam Buchanan MD     Procedures: see electronic medical records for all procedures/Xrays and details which  were not copied into this note but were reviewed prior to creation of Plan.       LABS:  Recent Labs      06/01/18 0127 05/31/18   0540   WBC  9.8  7.7   HGB  8.6*  6.9*   HCT  25.3*  20.8*   PLT  216  199     Recent Labs      06/01/18   0127  05/31/18   0540  05/30/18   0028  05/29/18   1238   NA  132*  129*  125*  126*   K  5.2*  4.3  4.1  4.5   CL  96*  94*  91*  91*   CO2  23  24  23  22   BUN  85*  72*  74*  76*   CREA  5.23*  5.07*  4.96*  5.13*   GLU  82  172*  100  94   CA  8.6  8.2*  8.2*  9.0  9.3   MG   --    --   2.1  2.3   PHOS   --    --   5.4*  5.1*     Recent Labs 05/31/18   0540  05/29/18   1238   SGOT  28  41*   AP  59  72   TP  6.0*  7.3   ALB  2.9*  3.6   GLOB  3.1  3.7     Recent Labs      05/31/18   1812   INR  1.0   PTP  10.0   APTT  32.2*      Recent Labs      05/30/18   0028   TIBC  274   PSAT  20   FERR  122      No results found for: FOL, RBCF   No results for input(s): PH, PCO2, PO2 in the last 72 hours.   Recent Labs      05/31/18   0540   CPK  215*     No components found for: [place]  Lab Results   Component Value Date/Time    Color YELLOW/STRAW 05/29/2018 12:59 PM    Appearance CLEAR 05/29/2018 12:59 PM    Specific gravity 1.008 05/29/2018 12:59 PM    pH (UA) 7.0 05/29/2018 12:59 PM    Protein 100 (A) 05/29/2018 12:59 PM    Glucose NEGATIVE  05/29/2018 12:59 PM    Ketone NEGATIVE  05/29/2018 12:59 PM    Bilirubin NEGATIVE  05/29/2018 12:59 PM    Urobilinogen 0.2 05/29/2018 12:59 PM    Nitrites NEGATIVE  05/29/2018 12:59 PM    Leukocyte Esterase NEGATIVE  05/29/2018 12:59 PM    Epithelial cells FEW 05/29/2018 12:59 PM    Bacteria NEGATIVE  05/29/2018 12:59 PM    WBC 0-4 05/29/2018 12:59 PM    RBC 0-5 05/29/2018 12:59 PM       MEDICATIONS:  Current Facility-Administered Medications   Medication Dose Route Frequency    amLODIPine (NORVASC) tablet 10 mg  10 mg Oral QHS    insulin lispro (HUMALOG) injection   SubCUTAneous AC&HS    0.9% sodium chloride infusion 250 mL  250 mL IntraVENous PRN    insulin glargine (LANTUS) injection 12 Units  12 Units SubCUTAneous DAILY    0.9% sodium chloride infusion 250 mL  250 mL IntraVENous PRN    acetaminophen (TYLENOL) tablet 650 mg  650 mg Oral Q4H PRN    diphenhydrAMINE (BENADRYL) capsule 25 mg  25 mg Oral Q6H PRN    desmopressin (DDAVP) 16.88 mcg in 0.9% sodium chloride 50 mL IVPB  0.3 mcg/kg IntraVENous ONCE    cloNIDine HCl (CATAPRES) tablet 0.1 mg  0.1 mg Oral Q4H PRN    levothyroxine (SYNTHROID) tablet 150 mcg  150 mcg Oral QHS    sodium chloride (NS) flush 5-10 mL  5-10 mL IntraVENous Q8H    sodium chloride (NS) flush 5-10 mL  5-10 mL IntraVENous PRN    glucose chewable tablet 16 g  4 Tab Oral PRN    dextrose (D50W) injection syrg 12.5-25 g  12.5-25 g IntraVENous PRN    glucagon (GLUCAGEN) injection 1 mg  1 mg IntraMUSCular PRN    hydrALAZINE (APRESOLINE) 20 mg/mL injection 10 mg  10 mg IntraVENous Q6H PRN

## 2018-06-01 NOTE — PROGRESS NOTES
0900  XRay recovery nurse left \"[employer]\" paperwork for Dr. Dianna Funez to sign. [id number]  Primary nurse called Xray recovery nurse, to confirm that after Dr. Dianna Funez comes to beside to sign paperwork, primary nurse will inform xray recovery in order to time the administration of DDAVP per [facility]     0943  Xray recovery nurse called. Dr has not been to bedside yet.  Advised primary nurse to administer DDAVP at 1015

## 2018-06-01 NOTE — PROGRESS NOTES
/62. BP too to do procedure at this point. Dr. Odell Francisco suggests Dr. Suzy Azar be paged to manage BP. Dr. Almonte Sayres paged. Pt also tearful regarding procedure and pain associated with procedures done to her recently. Tried to reassure pt.  Will discuss this with MD.

## 2018-06-01 NOTE — PROGRESS NOTES
Bedside and Verbal shift change report given to Jaky (oncoming nurse) by Fannie Carter (offgoing nurse). Report included the following information SBAR, Kardex, Intake/Output, MAR, Accordion and Recent Results. Pt had kidney biopsy today. H&H due at 2030 and 6/2 at 0230. Currently on bedrest and NPO until 2030. Rack Urine in progress. D5NS at 100/hr given to help hypoglycemia control while NPO.

## 2018-06-01 NOTE — PROGRESS NOTES
Bedside and Verbal shift change report given to Raj Carrasquillo (oncoming nurse) by Pantera Perez RN (offgoing nurse). Report included the following information SBAR, Kardex, MAR and Recent Results.

## 2018-06-01 NOTE — PROGRESS NOTES
Dr. Chel Serrano has spoken with pt and Dr. Marline Sacks. Procedure is back on schedule. Pt in agreement. Consent signed and on chart. CT notified. 'X systolic.

## 2018-06-01 NOTE — PROGRESS NOTES
HYPOGLYCEMIC EPISODE DOCUMENTATION    Patient with hypoglycemic episode(s) at 0415(time) on June 1, 2018(date). BG value(s) pre-treatment 46    Was patient symptomatic? [] yes, [x] no    Patient's personal monitor notified her she had a blood glucose of 56. Patient notified the RN and her blood glucose was checked using the hospitals monitors. The patients blood glucose was 52. IV dextrose was given to bring up blood glucose up. Patient was treated with the following rescue medications/treatments: [x] D50                [] Glucose tablets                [] Glucagon                [] 4oz juice                [] 6oz reg soda                [] 8oz low fat milk  BG value post-treatment: 199  Once BG treated and value greater than 80mg/dl, pt was provided with the following:  [] snack  [] meal  Name of MD notified:   The following orders were received: none

## 2018-06-01 NOTE — PROGRESS NOTES
Name of procedure: [facility] Renal Biopsy    Complications, if any, r/t procedure: none    Sedation medications given: 2 mg Versed, 50 mcg Fentanyl    Sedation tolerated: well    VS : Stable     Post Procedure Care Needed/order sets in [facility]: see orders    Pt tolerated procedure well. VSS. No C/O pain. Dressing to site D&I. No bleeding or hematoma noted to site. Pt resting comfortably on stretcher. HOB flat and NPO x 6 hours per MD orders. Dr. Chel Serrano called and given updates on pt and MD to place further post biopsy orders. Report called to Wes Palma RN who will assume care of pt. Pt taken to room by transport. VSS. No C/O pain. Dressing to site D&I. No bleeding or hematoma noted to site. Pt flat on stretcher. NAD noted at time of transfer to room. TRANSFER - OUT REPORT:    Verbal report given to Wes Palma RN on CLARIBEL  being transferred to room -17-19- for routine post - op       Report consisted of patients Situation, Background, Assessment and   Recommendations(SBAR). Information from the following report(s) SBAR, Kardex, Procedure Summary, Intake/Output and MAR was reviewed with the receiving nurse. Lines:   Peripheral IV 05/29/18 Left Antecubital (Active)   Site Assessment Clean, dry, & intact 6/1/2018  8:15 AM   Phlebitis Assessment 0 6/1/2018  8:15 AM   Infiltration Assessment 0 6/1/2018  8:15 AM   Dressing Status Clean, dry, & intact 6/1/2018  8:15 AM   Dressing Type Tape;Transparent 6/1/2018  8:15 AM   Hub Color/Line Status Pink;Flushed 6/1/2018  8:15 AM        Opportunity for questions and clarification was provided.

## 2018-06-01 NOTE — PROGRESS NOTES
[id number]  Paged Dr. Cass Lentz returned page. Informed of pts BP of 95/47 and also pt pain. Orders for 1mg IV morphine Q6HPRN, no others orders rec'd at this time. Will continue to monitor BP.    1652  Pt BG is 44. Will page Dr. Jovanny Orantes for orders to hang D5 IVF as pt is NPO until 2030. No answer at Dr. Jovanny Orantes office. [id number]  Paged Dr. Mary Ann Lentz.  Orders rec'd to administer D5NS at 100/hr and to monitor BG with POC frequently

## 2018-06-01 NOTE — H&P
Radiology History and Physical    Patient: Andrea Shepard 81 y.o. female       Chief Complaint: End Stage Renal Disease (Ambulatory w/ referral from endocrinologist w/ c/o increased kidney function & need for nephrology consult. )      History of Present Illness: Renal dysfunction for CT guided renal biopsy. History:    Past Medical History:   Diagnosis Date    Hypertension     Hypothyroidism     Type I diabetes mellitus ([facility]      History reviewed. No pertinent family history. Social History     Social History    Marital status: UNKNOWN     Spouse name: N/A    Number of children: N/A    Years of education: N/A     Occupational History    Not on file. Social History Main Topics    Smoking status: Never Smoker    Smokeless tobacco: Never Used    Alcohol use No    Drug use: No    Sexual activity: Not on file     Other Topics Concern    Not on file     Social History Narrative       Allergies:    Allergies   Allergen Reactions    Penicillins Unknown (comments)       Current Medications:  Current Facility-Administered Medications   Medication Dose Route Frequency    amLODIPine (NORVASC) tablet 10 mg  10 mg Oral QHS    insulin lispro (HUMALOG) injection   SubCUTAneous AC&HS    0.9% sodium chloride infusion 250 mL  250 mL IntraVENous PRN    insulin glargine (LANTUS) injection 12 Units  12 Units SubCUTAneous DAILY    0.9% sodium chloride infusion 250 mL  250 mL IntraVENous PRN    acetaminophen (TYLENOL) tablet 650 mg  650 mg Oral Q4H PRN    diphenhydrAMINE (BENADRYL) capsule 25 mg  25 mg Oral Q6H PRN    cloNIDine HCl (CATAPRES) tablet 0.1 mg  0.1 mg Oral Q4H PRN    levothyroxine (SYNTHROID) tablet 150 mcg  150 mcg Oral QHS    sodium chloride (NS) flush 5-10 mL  5-10 mL IntraVENous Q8H    sodium chloride (NS) flush 5-10 mL  5-10 mL IntraVENous PRN    glucose chewable tablet 16 g  4 Tab Oral PRN    dextrose (D50W) injection syrg 12.5-25 g  12.5-25 g IntraVENous PRN    glucagon (GLUCAGEN) injection 1 mg  1 mg IntraMUSCular PRN    hydrALAZINE (APRESOLINE) 20 mg/mL injection 10 mg  10 mg IntraVENous Q6H PRN        Physical Exam:  Blood pressure 149/71, pulse 73, temperature 97.8 °F (36.6 °C), resp. rate 18, height 5' 4\" (1.626 m), weight 55.3 kg (121 lb 14.6 oz), SpO2 96 %. GENERAL: alert, cooperative, no distress, appears stated age, LUNG: clear to auscultation bilaterally, HEART: regular rate and rhythm      Alerts:    Hospital Problems  Date Reviewed: 5/25/2018          Codes Class Noted POA    Hypothyroidism ICD-10-CM: E03.9  ICD-9-CM: 244.9  Unknown Yes        DM (diabetes mellitus) ([facility] ICD-10-CM: E11.9  ICD-9-CM: 250.00  5/29/2018 Unknown        MELISSA (acute kidney injury) ([facility] ICD-10-CM: N17.9  ICD-9-CM: 584.9  5/29/2018 Unknown              Laboratory:      Recent Labs      06/01/18   0127  05/31/18   1812   HGB  8.6*   --    HCT  25.3*   --    WBC  9.8   --    PLT  216   --    INR   --   1.0   BUN  85*   --    CREA  5.23*   --    K  5.2*   --          Plan of Care/Planned Procedure:  Risks, benefits, and alternatives reviewed with patient and she agrees to proceed with the procedure.        Rosemarie Vyas MD

## 2018-06-01 NOTE — PROGRESS NOTES
Reason for Admission:   Pt was admitted 5/29/18 d/t a Dx: MELISSA. DM. AO Murmur, Hypothyroidism. HTN. RRAT Score:     24             Resources/supports as identified by patient/family:   Pt has Medicare and BC/BS                Top Challenges facing patient (as identified by patient/family and CM): Finances/Medication cost?      No concerns indicated by pt/spouse. Pt goes to [facility] on [address]	[place]. Transportation? Pt was driving but spouse would be able to transport if needed. Support system or lack thereof? Spouse: Le Vann. Son: Larissa Sargent. Pt has had lots of visitors: Friends/family. PCP is Dr. Todd Bell. Endocrinologist is Dr. Miguel Dixon here at [facility] and she is very close with him. Living arrangements? Pt lives with spouse in two story home with 1 INDERJIT. Bedroom is on the first floor. Self-care/ADLs/Cognition? Pt is alert and oriented. Pt I W ADLs and IADLs. Pt was driving prior to admission. .           Current Advanced Directive/Advance Care Plan:  Pt is listed as Full Code. Pt has no medical directive on file. Plan for utilizing home health:    Not indicated at this time. Likelihood of readmission: HIGH                 Transition of Care Plan:              Anticipate Pt being here through the weekend and MD will review BIOPSY results and determine next plan. Specialist will work with Hospitalist to develop plan of care for ex determine if we need to set up HD for Pt or not. Spouse should be able to transport Pt home in car.      4:32 PM   Physical Address is [address]. CM will continue to monitor discharge plan. Care Management Interventions  PCP Verified by CM: Yes  Palliative Care Criteria Met (RRAT>21 & CHF Dx)?: No  Mode of Transport at Discharge:  Other (see comment)  Transition of Care Consult (CM Consult): Discharge Planning  MyChart Signup: No  Discharge Durable Medical Equipment: No  Physical Therapy Consult: No  Occupational Therapy Consult: No  Speech Therapy Consult: No  Current Support Network: Lives with Spouse, Own Home  Confirm Follow Up Transport: Self  Discharge Location  Discharge Placement: Unable to determine at this time    Eduardo Stafford2 OMID Fernandez

## 2018-06-01 NOTE — DIABETES MGMT
DTC Progress Note    Recommendations/ Comments: Pt with hypoglycemia again today despite decreased lantus this am.  Will notify Dr. Sang Andrea. Insulin orders per him. Current hospital DM medication:   -Lantus 12 units daily   -Humalog normal sensitivity correction    Chart reviewed on . Patient is a 81 y.o. female with known history of  Type 2 Diabetes on lantus 14 units daily and Humalog 6-12 units ac tid at home. A1c:   No results found for: HBA1C, HGBE8, WNJ1XPLN, TWS4FRKS    Recent Glucose Results:   Lab Results   Component Value Date/Time    GLU 82 06/01/2018 01:27 AM    GLUCPOC 97 06/01/2018 11:11 AM    GLUCPOC 72 06/01/2018 10:56 AM    GLUCPOC 64 (L) 06/01/2018 10:49 AM        Lab Results   Component Value Date/Time    Creatinine 5.23 (H) 06/01/2018 01:27 AM     Estimated Creatinine Clearance: 8.6 mL/min (based on Cr of 5.23). Active Orders   Diet    DIET NPO        PO intake:   Patient Vitals for the past 72 hrs:   % Diet Eaten   05/30/18 1400 100 %   05/30/18 0903 100 %       Will continue to follow as needed.     Thank you  RAMAN Anaya, RN, [address]

## 2018-06-01 NOTE — PROGRESS NOTES
HYPOGLYCEMIC EPISODE DOCUMENTATION    Patient with hypoglycemic episode(s) at 0030(time) on June 1, 2018(date). BG value(s) pre-treatment 61    Was patient symptomatic? [] yes, [x] no  Patient was treated with the following rescue medications/treatments: [x] D50                [] Glucose tablets                [] Glucagon                [] 4oz juice                [] 6oz reg soda                [] 8oz low fat milk    Patient NPO due to procedure tomorrow. Patient monitors her own T1DM. Patient notified RN that her blood glucose was 60. Patient was given IV dextrose as ordered. Blood glucose was checked again in 15 minutes and had come up to 125. BG value post-treatment: 125  Once BG treated and value greater than 80mg/dl, pt was provided with the following: Patient NPO  [] snack  [] meal  Name of MD notified:   The following orders were received: none

## 2018-06-01 NOTE — PROGRESS NOTES
Hospitalist Progress Note    NAME: Bernardino Abreu   :  1945   MRN:  [id number]       Assessment / Plan:  MELISSA on CKD 3, worsening slightly  DMI, 52 year history  Nephrotic proteinura  Anemia, improved s/p 1 unit pRBC   Secondary Hyperparathyroidism  Hyponatremia, improving  -Continue tele admission  -Nephrology help appreciated - still concerned about acute component in regards to her sudden worsening renal function  -Na improving, fluid restriction - pt states that she drinks \"a lot\"  -Pt with very specific requests in regards to her DM care - appreciate Endo note. Advised her and her family that her FS control is still acceptable and at the moment the greater concern lies with her kidney function.   -Pt also refusing Heparin and wants to ambulate and \"excercise\" have told her to take Heparin or SCDs at least and that she can ambulate the hallways but just do it in the evening when it is less busy  -PTH, Ca and Phos levels indicate secondary hyperpara  -H/H noted, continue to monitor    :  Hgb 6.9 - pt should receive 1 unit pRBC. However she wants to first discuss and clear this with Dr. Lauren Combs. Spoke with pt's daughter in private and she states that her mother and father do not fully understand the situation and \"in denial.\" Advised the family that I think she should get 1 unit, and use Heparin for DVT prophylaxis. Up to the patient and whatever her discussions with Dr. Lauren Combs are if she wishes to follow them. In regards to her renal function, it has not improved and repeat this morning shows a slight worsening. It appears Nephro plan is headed towards biopsy which I would agree with. Na improving with fluid restriction. :  Pt for kidney biopsy at 10am today. Cr worsened slightly, which the pt and her family did not take well. Hopeful that the biopsy will help to guide some potential treatment for her. K mildly elevated at 5.2, likely a consequence of worsening renal function.  H/H rebounded appropriately after receiving 1 unit yesterday.     18.5 - 24.9 Normal weight  Body mass index is 21.27 kg/(m^2).     Code status: Full  Prophylaxis: Hep SQ  Recommended Disposition: Home w/Family     Subjective:     Chief Complaint / Reason for Physician Visit  No complaints. Discussed with RN events overnight. Review of Systems:  Symptom Y/N Comments  Symptom Y/N Comments   Fever/Chills n   Chest Pain n    Poor Appetite    Edema     Cough n   Abdominal Pain n    Sputum    Joint Pain     SOB/COPPOLA    Pruritis/Rash     Nausea/vomit n   Tolerating PT/OT     Diarrhea    Tolerating Diet     Constipation    Other       Could NOT obtain due to:      Objective:     VITALS:   Last 24hrs VS reviewed since prior progress note. Most recent are:  Patient Vitals for the past 24 hrs:   Temp Pulse Resp BP SpO2   06/01/18 0802 97.8 °F (36.6 °C) 73 18 149/71 96 %   05/31/18 2319 97.7 °F (36.5 °C) 72 19 172/76 99 %   05/31/18 1728 97.8 °F (36.6 °C) 80 18 166/79 99 %   05/31/18 1618 97.7 °F (36.5 °C) 77 18 151/72 99 %   05/31/18 1547 97.9 °F (36.6 °C) 80 18 166/74 99 %   05/31/18 1515 - 69 - 133/65 -   05/31/18 1443 97.9 °F (36.6 °C) 72 18 142/65 100 %   05/31/18 1426 97.8 °F (36.6 °C) 72 20 155/69 100 %   05/31/18 1403 97.4 °F (36.3 °C) 76 18 160/67 100 %       Intake/Output Summary (Last 24 hours) at 06/01/18 1017  Last data filed at 06/01/18 0042   Gross per 24 hour   Intake            289.2 ml   Output             2000 ml   Net          -1710.8 ml        PHYSICAL EXAM:  General: WD, WN. Alert, cooperative, no acute distress    EENT:  EOMI. Anicteric sclerae. MMM  Resp:  CTA bilaterally, no wheezing or rales. No accessory muscle use  CV:  Regular  rhythm,  No edema  GI:  Soft, Non distended, Non tender.  +Bowel sounds  Neurologic:  Alert and oriented X 3, normal speech,   Psych:   Good insight. Not anxious nor agitated  Skin:  No rashes.   No jaundice    Reviewed most current lab test results and cultures  YES  Reviewed most current radiology test results   YES  Review and summation of old records today    NO  Reviewed patient's current orders and MAR    YES  PMH/SH reviewed - no change compared to H&P  ________________________________________________________________________  Care Plan discussed with:    Comments   Patient x    Family  x    RN x    Care Manager     Consultant                        Multidiciplinary team rounds were held today with , nursing, pharmacist and clinical coordinator. Patient's plan of care was discussed; medications were reviewed and discharge planning was addressed. ________________________________________________________________________  Total NON critical care TIME:  25   Minutes    Total CRITICAL CARE TIME Spent:   Minutes non procedure based      Comments   >50% of visit spent in counseling and coordination of care     ________________________________________________________________________  Charolette Peabody, MD     Procedures: see electronic medical records for all procedures/Xrays and details which were not copied into this note but were reviewed prior to creation of Plan. LABS:  I reviewed today's most current labs and imaging studies.   Pertinent labs include:  Recent Labs      06/01/18   0127 05/31/18   0540  05/30/18   0028   WBC  9.8  7.7  8.6   HGB  8.6*  6.9*  7.2*   HCT  25.3*  20.8*  21.6*   PLT  216  199  209     Recent Labs      06/01/18   0127  05/31/18   1812  05/31/18   0540  05/30/18   0028  05/29/18   1238   NA  132*   --   129*  125*  126*   K  5.2*   --   4.3  4.1  4.5   CL  96*   --   94*  91*  91*   CO2  23   --   24  23  22   GLU  82   --   172*  100  94   BUN  85*   --   72*  74*  76*   CREA  5.23*   --   5.07*  4.96*  5.13*   CA  8.6   --   8.2*  8.2*  9.0  9.3   MG   --    --    --   2.1  2.3   PHOS   --    --    --   5.4*  5.1*   ALB   --    --   2.9*   --   3.6   TBILI   --    --   0.3   --   0.5   SGOT   --    --   28   --   41*   ALT   --    --   53   -- 70   INR   --   1.0   --    --    --        Signed: Rios Tristan MD

## 2018-06-02 VITALS
RESPIRATION RATE: 20 BRPM | TEMPERATURE: 97.8 F | WEIGHT: 121.91 LBS | HEIGHT: 64 IN | SYSTOLIC BLOOD PRESSURE: 161 MMHG | DIASTOLIC BLOOD PRESSURE: 72 MMHG | HEART RATE: 75 BPM | BODY MASS INDEX: 20.81 KG/M2 | OXYGEN SATURATION: 98 %

## 2018-06-02 LAB
ANION GAP SERPL CALC-SCNC: 11 MMOL/L (ref 5–15)
BASOPHILS # BLD: 0 K/UL (ref 0–0.1)
BASOPHILS NFR BLD: 0 % (ref 0–1)
BUN SERPL-MCNC: 74 MG/DL (ref 6–20)
BUN/CREAT SERPL: 15 (ref 12–20)
CALCIUM SERPL-MCNC: 8 MG/DL (ref 8.5–10.1)
CHLORIDE SERPL-SCNC: 98 MMOL/L (ref 97–108)
CO2 SERPL-SCNC: 24 MMOL/L (ref 21–32)
CREAT SERPL-MCNC: 4.99 MG/DL (ref 0.55–1.02)
DIFFERENTIAL METHOD BLD: ABNORMAL
EOSINOPHIL # BLD: 0.1 K/UL (ref 0–0.4)
EOSINOPHIL NFR BLD: 2 % (ref 0–7)
ERYTHROCYTE [DISTWIDTH] IN BLOOD BY AUTOMATED COUNT: 14.4 % (ref 11.5–14.5)
GLUCOSE BLD STRIP.AUTO-MCNC: 104 MG/DL (ref 65–100)
GLUCOSE BLD STRIP.AUTO-MCNC: 173 MG/DL (ref 65–100)
GLUCOSE SERPL-MCNC: 176 MG/DL (ref 65–100)
HCT VFR BLD AUTO: 20.9 % (ref 35–47)
HGB BLD-MCNC: 6.8 G/DL (ref 11.5–16)
IMM GRANULOCYTES # BLD: 0 K/UL (ref 0–0.04)
IMM GRANULOCYTES NFR BLD AUTO: 0 % (ref 0–0.5)
LYMPHOCYTES # BLD: 1.3 K/UL (ref 0.8–3.5)
LYMPHOCYTES NFR BLD: 18 % (ref 12–49)
MCH RBC QN AUTO: 30.8 PG (ref 26–34)
MCHC RBC AUTO-ENTMCNC: 32.5 G/DL (ref 30–36.5)
MCV RBC AUTO: 94.6 FL (ref 80–99)
MONOCYTES # BLD: 0.9 K/UL (ref 0–1)
MONOCYTES NFR BLD: 13 % (ref 5–13)
NEUTS SEG # BLD: 4.7 K/UL (ref 1.8–8)
NEUTS SEG NFR BLD: 67 % (ref 32–75)
NRBC # BLD: 0 K/UL (ref 0–0.01)
NRBC BLD-RTO: 0 PER 100 WBC
PLATELET # BLD AUTO: 176 K/UL (ref 150–400)
PMV BLD AUTO: 12.1 FL (ref 8.9–12.9)
POTASSIUM SERPL-SCNC: 4.4 MMOL/L (ref 3.5–5.1)
RBC # BLD AUTO: 2.21 M/UL (ref 3.8–5.2)
SERVICE CMNT-IMP: ABNORMAL
SERVICE CMNT-IMP: ABNORMAL
SODIUM SERPL-SCNC: 133 MMOL/L (ref 136–145)
WBC # BLD AUTO: 7 K/UL (ref 3.6–11)

## 2018-06-02 PROCEDURE — P9016 RBC LEUKOCYTES REDUCED: HCPCS | Performed by: GENERAL ACUTE CARE HOSPITAL

## 2018-06-02 PROCEDURE — 36415 COLL VENOUS BLD VENIPUNCTURE: CPT | Performed by: GENERAL ACUTE CARE HOSPITAL

## 2018-06-02 PROCEDURE — 85025 COMPLETE CBC W/AUTO DIFF WBC: CPT | Performed by: GENERAL ACUTE CARE HOSPITAL

## 2018-06-02 PROCEDURE — 80048 BASIC METABOLIC PNL TOTAL CA: CPT | Performed by: GENERAL ACUTE CARE HOSPITAL

## 2018-06-02 PROCEDURE — 74011000258 HC RX REV CODE- 258: Performed by: GENERAL ACUTE CARE HOSPITAL

## 2018-06-02 PROCEDURE — 36430 TRANSFUSION BLD/BLD COMPNT: CPT

## 2018-06-02 PROCEDURE — 82962 GLUCOSE BLOOD TEST: CPT

## 2018-06-02 PROCEDURE — 74011250637 HC RX REV CODE- 250/637: Performed by: INTERNAL MEDICINE

## 2018-06-02 PROCEDURE — 74011636637 HC RX REV CODE- 636/637: Performed by: INTERNAL MEDICINE

## 2018-06-02 RX ORDER — AMLODIPINE BESYLATE 5 MG/1
10 TABLET ORAL
Status: DISCONTINUED | OUTPATIENT
Start: 2018-06-02 | End: 2018-06-02 | Stop reason: HOSPADM

## 2018-06-02 RX ORDER — SODIUM CHLORIDE 9 MG/ML
250 INJECTION, SOLUTION INTRAVENOUS AS NEEDED
Status: DISCONTINUED | OUTPATIENT
Start: 2018-06-02 | End: 2018-06-02 | Stop reason: HOSPADM

## 2018-06-02 RX ORDER — AMLODIPINE BESYLATE 5 MG/1
5 TABLET ORAL
Status: DISCONTINUED | OUTPATIENT
Start: 2018-06-02 | End: 2018-06-02

## 2018-06-02 RX ADMIN — CLONIDINE HYDROCHLORIDE 0.1 MG: 0.1 TABLET ORAL at 09:52

## 2018-06-02 RX ADMIN — DEXTROSE MONOHYDRATE AND SODIUM CHLORIDE 75 ML/HR: 5; .9 INJECTION, SOLUTION INTRAVENOUS at 03:24

## 2018-06-02 RX ADMIN — INSULIN LISPRO 3 UNITS: 100 INJECTION, SOLUTION INTRAVENOUS; SUBCUTANEOUS at 07:30

## 2018-06-02 RX ADMIN — INSULIN GLARGINE 10 UNITS: 100 INJECTION, SOLUTION SUBCUTANEOUS at 06:37

## 2018-06-02 NOTE — PROGRESS NOTES
08:25- [facility] Alison went into patient's room to obtain morning vital signs per protocol. Per pt statement, Alison placed BP cuff overlapping PIV. When BP cuff inflated, it became very painful. Pt stated she began saying to stop and that it hurt. Daughter stated she asked as well if Alison could stop BP cuff. Writer was in another room when Yuniel Fortune [facility] RN (did not care for this particular patient last night) found her to notify writer that patient needed her and was crying. When I entered the room, pt was found crying, very upset with Linda applying cold compress to IV site. IV site was swollen and could not flush. IV removed. Pt and daughter reported what (in their eyes) had happened. Unsure of what exactly happened as writer was in another room. Spoke to patient and daughter. Actively listened to their story and provided consoling. Spoke to tech, [employer]. Per [employer], she began to take blood pressure and it wasn't until it was almost done that pt began to complain that it hurt. She stated she felt awful about the whole situation. New IV inserted. Pt to get one unit of blood then discharged.

## 2018-06-02 NOTE — DISCHARGE SUMMARY
Hospitalist Discharge Summary     Patient ID:  Chuy Degroot  [id number]  81 y.o.  1945    PCP on record: Boone Adams MD    Admit date: 5/29/2018  Discharge date and time: 6/2/2018      DISCHARGE DIAGNOSIS:    See below       CONSULTATIONS:  IP CONSULT TO NEPHROLOGY  IP CONSULT TO ENDOCRINOLOGY  IP CONSULT TO HOSPITALIST    Excerpted HPI from H&P of Meghna Ojeda MD:  Chuy Degroot is a 81 y.o. PMH DM for 46 years,HTN who was feeling weak last week so she went to see her endocrinologist who order Blood work finding an elevated Cr. Because nephrologist was not able to be contacted over the weekend  Patient was advised to come to the ED for further eval and management    ______________________________________________________________________  DISCHARGE SUMMARY/HOSPITAL COURSE:  for full details see H&P, daily progress notes, labs, consult notes. MELISSA on CKD 3, stable, s/p biopsy 6/1  DMI, 52 year history  Nephrotic proteinura  Anemia, improved s/p 1 unit pRBC 5/31, s/p 1 unit pRBC 6/2  Secondary Hyperparathyroidism  Hyponatremia, improving  -Continue tele admission  -Nephrology help appreciated - still concerned about acute component in regards to her sudden worsening renal function  -Na improving, fluid restriction - pt states that she drinks \"a lot\"  -Pt with very specific requests in regards to her DM care - appreciate Endo note. Advised her and her family that her FS control is still acceptable and at the moment the greater concern lies with her kidney function.   -Pt also refusing Heparin and wants to ambulate and \"excercise\" have told her to take Heparin or SCDs at least and that she can ambulate the hallways but just do it in the evening when it is less busy  -PTH, Ca and Phos levels indicate secondary hyperpara  -H/H noted, continue to monitor    5/31:  Hgb 6.9 - pt should receive 1 unit pRBC. However she wants to first discuss and clear this with Dr. Andrea Flowers.  Spoke with pt's daughter in private and she states that her mother and father do not fully understand the situation and \"in denial.\" Advised the family that I think she should get 1 unit, and use Heparin for DVT prophylaxis. Up to the patient and whatever her discussions with Dr. Valerie Wills are if she wishes to follow them. In regards to her renal function, it has not improved and repeat this morning shows a slight worsening. It appears Nephro plan is headed towards biopsy which I would agree with. Na improving with fluid restriction. 6/1:  Pt for kidney biopsy at 10am today. Cr worsened slightly, which the pt and her family did not take well. Hopeful that the biopsy will help to guide some potential treatment for her. K mildly elevated at 5.2, likely a consequence of worsening renal function. H/H rebounded appropriately after receiving 1 unit yesterday. 6/2:  Pt with drop in H/H as expected s/p procedure. Pt to receive 1 unit pRBC and then discharged home. Case discussed with Nephrology. Follow up information provided. Pt stable for discharge home. BP remains volatile, mostly due to pt's emotional lability - to resume Norvasc.  _______________________________________________________________________  Patient seen and examined by me on discharge day. Pertinent Findings:  Gen:    Not in distress  Chest: Clear lungs  CVS:   Regular rhythm. No edema  Abd:  Soft, not distended, not tender  Neuro:  Alert, oriented x3  _______________________________________________________________________  DISCHARGE MEDICATIONS:   Current Discharge Medication List      CONTINUE these medications which have NOT CHANGED    Details   amLODIPine (NORVASC) 5 mg tablet Take 5 mg by mouth nightly. nepafenac (ILEVRO) 0.3 % drps Administer 1 Drop to left eye daily. prednisoLONE sodium phosphate (INFLAMASE FORTE) 1 % ophthalmic solution Administer 1 Drop to left eye daily.       levothyroxine (SYNTHROID) 150 mcg tablet Take 150 mcg by mouth nightly. vit C/E/Zn//lut/jennifer/bio/saf (RETAINE VISION PO) Administer 1 Drop to both eyes daily as needed ('Dry Eyes'). MAGNESIUM PO Take 1 Tab by mouth daily as needed ('Leg Cramping'). insulin glargine (LANTUS SOLOSTAR U-100 INSULIN) 100 unit/mL (3 mL) inpn 14 Units by SubCUTAneous route daily. HUMALOG KWIKPEN INSULIN 100 unit/mL kwikpen INJECT 6 TO 12 UNITS SUBCUTANEOUSLY 3 TIMES A DAY BEFORE MEALS AS DIRECTED  Indications: type 1 diabetes mellitus  Qty: 30 mL, Refills: 3    Associated Diagnoses: Uncontrolled type 1 diabetes mellitus without complication (HCC)         STOP taking these medications       irbesartan-hydroCHLOROthiazide (AVALIDE) 150-12.5 mg per tablet Comments:   Reason for Stopping:         ibuprofen (ADVIL) 200 mg tablet Comments:   Reason for Stopping:               My Recommended Diet, Activity, Wound Care, and follow-up labs are listed in the patient's Discharge Insturctions which I have personally completed and reviewed.     ______________________________________________________________________    Risk of deterioration: Low    Condition at Discharge:  Stable  ______________________________________________________________________    Disposition  Home with family, no needs  ______________________________________________________________________    Care Plan discussed with:   Patient, Family, RN, Care Manager, Consultant    ______________________________________________________________________    Code Status: Full Code  ______________________________________________________________________      Follow up with:   PCP : Ranjith Munguia MD  Follow-up Information     Follow up With Details Comments Contact [address] [place] [address]   [address] [facility]  [phone]                Total time in minutes spent coordinating this discharge (includes going over instructions, follow-up, prescriptions, and preparing report for sign off to her PCP) :  35 minutes    Signed:  Stevenson Forman MD

## 2018-06-02 NOTE — PROGRESS NOTES
Pt given discharge instructions,  medication administration changes reviewed, follow up information (Dr. Mimi Irwin and Dr. Christopher Adams). IV removed. Time for questions made. Family to transport.

## 2018-06-02 NOTE — DISCHARGE INSTRUCTIONS
Our office will call pt with biopsy results-Mon or Tuesday  [facility] [phone] to call us if she hasn't heard from us by then for appointment with Dr. Dulce Ramos in follow up. No heavy lifting or straining x 2 weeks. No aerobic x 2 weeks.   If any dark urine, pain at biopsy site, bleeding-return to hospital for eval.  Teagan Mclean MD

## 2018-06-02 NOTE — PROGRESS NOTES
NAME: Alva Estrada        :  1945        MRN:  [id number]        Assessment :    Plan:  --CKD-3  MELISSA  Nephrotic range proteinuria  Anemia  Hyperphosphatemia  Increased LFT\"s   Abdominal discomfort  Weight loss  Hyponatremia  DM - type I  NSAID use  Secondary hyperparathyroidism Creatinine 4.99  S/p renal biopsy--no ecchymosis at biopsy site-urine clear, no s/s of active bleeding post biopsy however  H/H down-she agrees to 1 unit of PRBCs today  Had d/w dr. Florence Siddiqi for weekend-since stable, Ok with renal dc. He will call pt with biopsy results once back (mon/tues)  Our office will call with appointment. No acute need for HD. Asked her to limit citrus and tomatoes over weekend. D/W pt-no heaving lifiting, straining x 2 weeks--any pain, bloody urine to return to ed immediately. No aerobic acitvity for now. She can walk      Please see dc info I left on DC instructions. Subjective:     Chief Complaint:  Has jelly beans to increase glucose. Review of Systems:    Symptom Y/N Comments  Symptom Y/N Comments   Fever/Chills    Chest Pain     Poor Appetite    Edema     Cough    Abdominal Pain     Sputum    Joint Pain     SOB/COPPOLA    Pruritis/Rash     Nausea/vomit    Tolerating PT/OT     Diarrhea    Tolerating Diet     Constipation    Other       Could not obtain due to:      Objective:     VITALS:   Last 24hrs VS reviewed since prior progress note.  Most recent are:  Visit Vitals    /54    Pulse 85    Temp 97.8 °F (36.6 °C)    Resp 20    Ht 5' 4\" (1.626 m)    Wt 55.3 kg (121 lb 14.6 oz)    SpO2 97%    BMI 20.93 kg/m2       Intake/Output Summary (Last 24 hours) at 18 0932  Last data filed at 18 0522   Gross per 24 hour   Intake           1167.5 ml   Output             1520 ml   Net           -352.5 ml      Telemetry Reviewed:     PHYSICAL EXAM:  General: NAD  Chest cta  CV rrr  Flank-no ecchymosis, bleeding, bruising  Trace ankle edema      Lab Data Reviewed: (see below)    Medications Reviewed: (see below)    PMH/SH reviewed - no change compared to H&P  ________________________________________________________________________  Care Plan discussed with:  Patient y    Family  y    RN     Care Manager                    Consultant:          Comments   >50% of visit spent in counseling and coordination of care       ________________________________________________________________________  Jhon Mitchell MD     Procedures: see electronic medical records for all procedures/Xrays and details which  were not copied into this note but were reviewed prior to creation of Plan. LABS:  Recent Labs      06/02/18 0612 06/01/18 2014 06/01/18 0127   WBC  7.0   --   9.8   HGB  6.8*  7.0*  8.6*   HCT  20.9*  21.2*  25.3*   PLT  176   --   216     Recent Labs      06/02/18 0612 06/01/18 0127 05/31/18   0540   NA  133*  132*  129*   K  4.4  5.2*  4.3   CL  98  96*  94*   CO2  24  23  24   BUN  74*  85*  72*   CREA  4.99*  5.23*  5.07*   GLU  176*  82  172*   CA  8.0*  8.6  8.2*     Recent Labs      05/31/18   0540   SGOT  28   AP  59   TP  6.0*   ALB  2.9*   GLOB  3.1     Recent Labs      05/31/18   1812   INR  1.0   PTP  10.0   APTT  32.2*      No results for input(s): FE, TIBC, PSAT, FERR in the last 72 hours. No results found for: FOL, RBCF   No results for input(s): PH, PCO2, PO2 in the last 72 hours.   Recent Labs      05/31/18   0540   CPK  215*     No components found for: [place]  Lab Results   Component Value Date/Time    Color YELLOW/STRAW 05/29/2018 12:59 PM    Appearance CLEAR 05/29/2018 12:59 PM    Specific gravity 1.008 05/29/2018 12:59 PM    pH (UA) 7.0 05/29/2018 12:59 PM    Protein 100 (A) 05/29/2018 12:59 PM    Glucose NEGATIVE  05/29/2018 12:59 PM    Ketone NEGATIVE  05/29/2018 12:59 PM    Bilirubin NEGATIVE  05/29/2018 12:59 PM    Urobilinogen 0.2 05/29/2018 12:59 PM    Nitrites NEGATIVE  05/29/2018 12:59 PM    Leukocyte Esterase NEGATIVE  05/29/2018 12:59 PM    Epithelial cells FEW 05/29/2018 12:59 PM    Bacteria NEGATIVE  05/29/2018 12:59 PM    WBC 0-4 05/29/2018 12:59 PM    RBC 0-5 05/29/2018 12:59 PM       MEDICATIONS:  Current Facility-Administered Medications   Medication Dose Route Frequency    0.9% sodium chloride infusion 250 mL  250 mL IntraVENous PRN    amLODIPine (NORVASC) tablet 10 mg  10 mg Oral QHS    insulin glargine (LANTUS) injection 10 Units  10 Units SubCUTAneous DAILY    morphine injection 1 mg  1 mg IntraVENous Q6H PRN    insulin lispro (HUMALOG) injection   SubCUTAneous AC&HS    0.9% sodium chloride infusion 250 mL  250 mL IntraVENous PRN    0.9% sodium chloride infusion 250 mL  250 mL IntraVENous PRN    acetaminophen (TYLENOL) tablet 650 mg  650 mg Oral Q4H PRN    diphenhydrAMINE (BENADRYL) capsule 25 mg  25 mg Oral Q6H PRN    cloNIDine HCl (CATAPRES) tablet 0.1 mg  0.1 mg Oral Q4H PRN    levothyroxine (SYNTHROID) tablet 150 mcg  150 mcg Oral QHS    sodium chloride (NS) flush 5-10 mL  5-10 mL IntraVENous Q8H    sodium chloride (NS) flush 5-10 mL  5-10 mL IntraVENous PRN    glucose chewable tablet 16 g  4 Tab Oral PRN    dextrose (D50W) injection syrg 12.5-25 g  12.5-25 g IntraVENous PRN    glucagon (GLUCAGEN) injection 1 mg  1 mg IntraMUSCular PRN    hydrALAZINE (APRESOLINE) 20 mg/mL injection 10 mg  10 mg IntraVENous Q6H PRN

## 2018-06-02 NOTE — PROGRESS NOTES
New IVF bag hung at 75 ml/hr per patient request; patient's continous BG monitor reading 167 and patient does not want to over correct.

## 2018-06-02 NOTE — PROGRESS NOTES
HYPOGLYCEMIC EPISODE DOCUMENTATION    Patient with hypoglycemic episode(s) at [phone] (time) on 6/1/18(date). BG value(s) pre-treatment 62   Was patient symptomatic? [x] yes, [] no  Patient was treated with the following rescue medications/treatments: [] D50                [x] Glucose tablets                [] Glucagon                [] 4oz juice                [] 6oz reg soda                [] 8oz low fat milk  BG value post-treatment: 85  Once BG treated and value greater than 80mg/dl, pt was provided with the following:  [] snack  [x] meal at 2030  Name of MD notified: The following orders were received:       Patient NPO and has had hypoglycemia all day. IVF D5 with normal saline running at 100 ml/hr started at 1712. Patient able to resume meals at 2030. Will continue to monitor.

## 2018-06-03 LAB
ABO + RH BLD: NORMAL
BLD PROD TYP BPU: NORMAL
BLD PROD TYP BPU: NORMAL
BLOOD GROUP ANTIBODIES SERPL: NORMAL
BPU ID: NORMAL
BPU ID: NORMAL
CROSSMATCH RESULT,%XM: NORMAL
CROSSMATCH RESULT,%XM: NORMAL
SPECIMEN EXP DATE BLD: NORMAL
STATUS OF UNIT,%ST: NORMAL
STATUS OF UNIT,%ST: NORMAL
UNIT DIVISION, %UDIV: 0
UNIT DIVISION, %UDIV: 0

## 2018-06-09 LAB — CREATININE, EXTERNAL: 3.79

## 2018-08-01 LAB — LDL-C, EXTERNAL: 170

## 2018-09-17 ENCOUNTER — OFFICE VISIT (OUTPATIENT)
Dept: ENDOCRINOLOGY | Age: 73
End: 2018-09-17

## 2018-09-17 VITALS
HEART RATE: 59 BPM | SYSTOLIC BLOOD PRESSURE: 204 MMHG | BODY MASS INDEX: 21.37 KG/M2 | HEIGHT: 64 IN | WEIGHT: 125.2 LBS | DIASTOLIC BLOOD PRESSURE: 81 MMHG

## 2018-09-17 DIAGNOSIS — N18.4 TYPE 1 DIABETES MELLITUS WITH STAGE 4 CHRONIC KIDNEY DISEASE (HCC): Primary | ICD-10-CM

## 2018-09-17 DIAGNOSIS — E10.22 TYPE 1 DIABETES MELLITUS WITH STAGE 4 CHRONIC KIDNEY DISEASE (HCC): Primary | ICD-10-CM

## 2018-09-17 LAB — HBA1C MFR BLD HPLC: 6.6 %

## 2018-09-17 RX ORDER — METOPROLOL SUCCINATE 200 MG/1
200 TABLET, EXTENDED RELEASE ORAL DAILY
COMMUNITY
Start: 2018-09-06

## 2018-09-17 RX ORDER — HYDRALAZINE HYDROCHLORIDE 50 MG/1
50 TABLET, FILM COATED ORAL 2 TIMES DAILY
COMMUNITY
Start: 2018-09-06

## 2018-09-17 RX ORDER — BUMETANIDE 1 MG/1
1 TABLET ORAL DAILY
COMMUNITY
Start: 2018-09-06

## 2018-09-17 RX ORDER — BROMFENAC SODIUM 0.7 MG/ML
SOLUTION/ DROPS OPHTHALMIC
COMMUNITY
Start: 2018-08-07

## 2018-09-17 NOTE — PROGRESS NOTES
The patient was seen by myself and Shahnaz Dudley DNP, CNS Hu Cherry returns for follow-up of her type 1 diabetes mellitus now with near end-stage renal disease. She had been on the transplant list and her daughter Maycol Aguero was found to have latent TB so was taken off the list as a potential donor. Her other daughter is now being considered as a potential donor for the transplant. Her most recent creatinine is 3.46. Her A1c today is 6.6%. She did very well over the summer. She has had some increasing fatigue and got her first injection of erythropoietin today. She still going to the gym every day and paddle boarding. She takes Lantus insulin 12 units and Humalog 2-3 units with her meals. Her blood sugars are outstanding. Usually she has fruit for breakfast, salad with chicken and fruit for lunch and dinner is a meat and a non-starchy vegetable. Examination Blood pressure 204/81 Pulse 59 Weight 125 Lungs clear Heart reveals a regular rate and rhythm with a 2/6 systolic ejection murmur heard best at the upper left sternal border Abdomen soft and nontender Extremities unremarkable. Diabetic foot exam:  
 
Left Foot: 
 Visual Exam: normal  
 Pulse DP: 2+ (normal) Filament test: normal sensation Vibratory sensation: normal 
   
Right Foot: 
 Visual Exam: normal  
 Pulse DP: 2+ (normal) Filament test: normal sensation Vibratory sensation: normal 
Impression type 1 diabetes mellitus with stage IV chronic kidney disease currently be considered for kidney transplant Plan: We did not change the regimen. She is interested in an Omni pod insulin pump and she and  discussed the possibility of learning the insulin pump prior to transplant. We will pursue this. She is also interested in the integrated system with the Dex com G6 and the Omni pod. We spent more than 25 mintutes face to face, more than 50% was in counseling regarding diet, exercise and carbohydrate intake. Darvin vela

## 2018-10-16 LAB — CREATININE, EXTERNAL: 2.9

## 2018-10-17 LAB — CREATININE, EXTERNAL: 2.9

## 2018-11-06 LAB — CREATININE, EXTERNAL: 3.13

## 2018-12-17 NOTE — PROGRESS NOTES
Ms. Alva Dietz returns for follow-up of her type 1 diabetes mellitus with chronic kidney disease. Her serum creatinine continues to decrease from a high of 5 to a recent value of 3. She was recently seen by her nephrologist and has repeat labs pending which may even be better. She feels terrific. She continues to take 16 units of Lantus daily and Humalog with her meals. She is actually finding the 16 units is a bit too much and she can go almost all day without Humalog. She has not had any episodes of severe hypoglycemia but occasionally gets down into the 50s and 60s which she has to treat. She continues to go to the gym in the morning. She usually exercises and then returns and has her meal and takes her Humalog. She has been concerned about her blood pressure. She saw her nephrologist recently and her blood pressure was apparently well controlled but certainly not so today. We downloaded her Dex com and she has beautiful blood sugars most of the time. Her A1c today is 7.5%. Examination  Blood pressure 205/83  Pulse 66  Weight 127  Lungs clear  Heart reveals a regular rate and rhythm without murmurs rubs or gallops  Abdomen soft and nontender    Diabetic foot exam:     Left Foot:   Visual Exam: normal    Pulse DP: 2+ (normal)   Filament test: normal sensation    Vibratory sensation: normal      Right Foot:   Visual Exam: normal    Pulse DP: 2+ (normal)   Filament test: normal sensation    Vibratory sensation: normal    Impression type 1 diabetes mellitus with chronic kidney disease  Plan: I will contact her nephrologist regarding her blood pressure today. This is markedly elevated despite hydralazine and metoprolol. Clearly we are staying away from amlodipine given the fact that there was a temporal relationship between the amlodipine and her worsening renal function. She also asked about her cholesterol.   I am hoping that she had a repeat lipid panel given her improving renal function I would expect her lipid panel to also be better. She was told by her cardiologist that she should be on a statin and we have discussed this many times over many years. We will discuss it again. We spent more than 25 mintutes face to face, more than 50% was in counseling regarding diet, exercise and carbohydrate intake.

## 2018-12-26 NOTE — PROGRESS NOTES
Ms. Henrietta Green presents today after 2 episodes in the emergency room for right-sided swelling and pain. The patient apparently experienced some acute right knee pain with swelling. She was seen in [facility] emergency room where a venous Doppler was performed. There was no evidence of venous occlusion. There was also the suggestion of a cyst measuring 0.7 x 2 cm. .  The patient was discharged home but returned to [facility] on [place] day with increasing swelling and increasing pain. The pain which had previously been in the right knee was now focalized to the right foot. I spoke to the patient's daughter multiple times on [place] day about the symptoms. I was informed that there was some red streaking in the right foot and agreed to see the patient today. Today the pain is much less. She has residual pain superior to the right knee. However she is much more able to ambulate without significant pain. Laboratory tests obtained on 12/25 include a sodium 139, potassium 4.7, chloride 107, bicarb 24, BUN 72, creatinine 3.8 with a GFR of 12. Examination  Blood pressure 181/75  Pulse 67  Weight 129  Examination of the right lower extremity reveals 2+ edema of the right lower extremity. There is point tenderness superior to the right knee. There is no evidence of fluctuance in the posterior fossa of the right knee. Examination of the left lower extremity is unremarkable. Impression  1. Ruptured Baker's cyst of the right knee, now improving  2. Type 1 diabetes mellitus with stage IV chronic kidney disease    Plan: Treatment will be supportive. The patient is not even taking pain medication at this time and feels well. Follow-up will be on an as-needed basis.

## 2019-01-01 ENCOUNTER — TELEPHONE (OUTPATIENT)
Dept: ENDOCRINOLOGY | Age: 74
End: 2019-01-01

## 2019-01-01 ENCOUNTER — OFFICE VISIT (OUTPATIENT)
Dept: ENDOCRINOLOGY | Age: 74
End: 2019-01-01

## 2019-01-01 VITALS
OXYGEN SATURATION: 97 % | HEART RATE: 65 BPM | SYSTOLIC BLOOD PRESSURE: 196 MMHG | HEIGHT: 64 IN | WEIGHT: 122.2 LBS | BODY MASS INDEX: 20.86 KG/M2 | DIASTOLIC BLOOD PRESSURE: 79 MMHG

## 2019-01-01 VITALS
SYSTOLIC BLOOD PRESSURE: 173 MMHG | WEIGHT: 120 LBS | HEART RATE: 65 BPM | HEIGHT: 64 IN | DIASTOLIC BLOOD PRESSURE: 75 MMHG | BODY MASS INDEX: 20.49 KG/M2

## 2019-01-01 VITALS
SYSTOLIC BLOOD PRESSURE: 175 MMHG | WEIGHT: 123.2 LBS | HEIGHT: 64 IN | HEART RATE: 62 BPM | DIASTOLIC BLOOD PRESSURE: 78 MMHG | BODY MASS INDEX: 21.03 KG/M2

## 2019-01-01 DIAGNOSIS — N18.4 TYPE 1 DIABETES MELLITUS WITH STAGE 4 CHRONIC KIDNEY DISEASE (HCC): Primary | ICD-10-CM

## 2019-01-01 DIAGNOSIS — E10.22 TYPE 1 DIABETES MELLITUS WITH STAGE 4 CHRONIC KIDNEY DISEASE (HCC): ICD-10-CM

## 2019-01-01 DIAGNOSIS — E10.22 TYPE 1 DIABETES MELLITUS WITH STAGE 4 CHRONIC KIDNEY DISEASE (HCC): Primary | ICD-10-CM

## 2019-01-01 DIAGNOSIS — N18.4 TYPE 1 DIABETES MELLITUS WITH STAGE 4 CHRONIC KIDNEY DISEASE (HCC): ICD-10-CM

## 2019-01-01 DIAGNOSIS — E03.9 ACQUIRED HYPOTHYROIDISM: ICD-10-CM

## 2019-01-01 LAB
C PEPTIDE SERPL-MCNC: <0.1 NG/ML (ref 1.1–4.4)
CREATININE, EXTERNAL: 3.47
CREATININE, EXTERNAL: 3.49
CREATININE, EXTERNAL: 3.75
CREATININE, EXTERNAL: 3.78
CREATININE, EXTERNAL: 3.91
CREATININE, EXTERNAL: 3.91
CREATININE, EXTERNAL: 4.09
CREATININE, EXTERNAL: 4.37
GLUCOSE SERPL-MCNC: 114 MG/DL (ref 65–99)
HBA1C MFR BLD HPLC: 6.2 %
HBA1C MFR BLD HPLC: 7.1 %
INTERPRETATION: NORMAL
Lab: NORMAL

## 2019-01-01 RX ORDER — INSULIN LISPRO 100 [IU]/ML
INJECTION, SOLUTION INTRAVENOUS; SUBCUTANEOUS
Qty: 90 ML | Refills: 3 | Status: SHIPPED | OUTPATIENT
Start: 2019-01-01

## 2019-01-01 RX ORDER — INSULIN LISPRO 100 [IU]/ML
INJECTION, SOLUTION INTRAVENOUS; SUBCUTANEOUS
Qty: 40 ML | Refills: 3 | Status: SHIPPED | OUTPATIENT
Start: 2019-01-01 | End: 2019-01-01 | Stop reason: SDUPTHER

## 2019-01-01 RX ORDER — ERGOCALCIFEROL 1.25 MG/1
CAPSULE ORAL
Refills: 0 | COMMUNITY
Start: 2019-01-01

## 2019-03-18 NOTE — PROGRESS NOTES
Ms. Primo Hanna returns for follow-up of her type 1 diabetes mellitus with outstanding glucose control on basal bolus insulin therapy. She is using a Dex com G5 system which is provided a lot of reassurance for her in terms of her overall blood sugar control. Her A1c today is 6.2% but it is important to point out that she is received 1 or 2 erythropoietin injections in the last 3 months which may be contributing to the lower A1c. Her rate most recent renal function included a creatinine of 3.5 and a GFR of 13 which is relatively stable. She says she is eating well but in the next breath apparently she is been trying to lose weight to get into address for her 50th high school reunion. She has had no episodes of significant hypoglycemia. This morning which is not her usual, she was 219 and took 1 unit before she went to the gym. She did have an episode of low blood sugar at the gym. Examination  Blood pressure 175/78  Pulse 62  Weight 123    Diabetic foot exam:     Left Foot:   Visual Exam: normal    Pulse DP: 2+ (normal)   Filament test: normal sensation    Vibratory sensation: normal      Right Foot:   Visual Exam: normal    Pulse DP: 2+ (normal)   Filament test: normal sensation    Vibratory sensation: normal    Impression type 1 diabetes mellitus with overall good glucose control on basal bolus therapy. Plan  We will continue the 14 units of Lantus daily and Humalog with her meals. She is interested in the Dex com/T slim system when that becomes available.

## 2019-04-01 NOTE — TELEPHONE ENCOUNTER
Hi Dr Luann Mueller. Its . When I was there I couldnt remember my record for the anemia shots from Dr Carrie Nelson. Here is the record. Each shot was low dose    1/22  hemoglobin was 10.1 Shot was 66 mg  2/12 hemoglobin was 10.2 Shot was 66mg  3/5 Hemoglobin was 10.9 and they held the shot  3/20 Hemoglobin was 10.7 and the shot was 66mg.

## 2019-06-24 NOTE — PROGRESS NOTES
Tia Haas returns for follow-up of her type 1 diabetes mellitus x50 years on Lantus and Humalog. She takes 15 units of Lantus in the morning and Humalog with her meals. Her A1c today is 7.1%. We downloaded her Dexcom continuous glucose monitoring system and her blood sugars overnight and throughout the morning are excellent. The only major excursion and her blood sugars can occur in the afternoon or evening. This is not by any means every day but if there is going to be fluctuation it is in the evening part of the day. She occasionally can have sugars in the 250 range after a particularly high carb meal.  Her regimen stays the same. She exercises in the morning every day and she eats a healthy diet throughout the day. Fortunately, because of the Dexcom, she is not having significant hypoglycemia. Examination  Blood pressure 173/75  Pulse 65  Weight 120    Impression type 1 diabetes mellitus with excellent glucose control and chronic kidney disease with stable serum creatinine. Plan: No changes in the regimen were made. She is still interested in a Dex com/tandem and/or Dexcom/Omni pod system once those systems are integrated. We will see her back in 3 months or sooner as needed. We spent more than 25 mintutes face to face, more than 50% was in counseling regarding diet, exercise and carbohydrate intake.

## 2019-09-09 NOTE — PROGRESS NOTES
Ms. Marisela Mcgregor returns for follow-up of her type 1 diabetes mellitus, primary hypothyroidism, and chronic kidney disease stage IV being considered for living related donor kidney transplant in the very near future. Regarding the transplant, this will be done at [facility].  Her daughter is very willing and is eager to do the note donation. Regarding her diabetes she continues to do very well. She is on Lantus insulin 14 units daily, Humalog 4 to 6 units with meals. She continues to exercise in the morning every day. Although her renal function is deteriorated slightly she feels better than she is felt in a very long time. She continues to use her Dexcom G5 system with good result. She does admit that she is fixating on the fluctuations in blood sugar a bit more and so she is treating the ending lows and then treating the subsequent elevations in blood sugar which is leading to more fluctuation than she had had previously. Examination  Blood pressure 196/79  Pulse 65  Weight 122    Impression type 1 diabetes mellitus chronic kidney disease stage IV  Plan: She now has her T slim insulin pump and will be getting her Dexcom G6 system. So she will be coming in very soon for Candida Contreras instructed her in the pump and the Dexcom G6. I sent prescriptions for insulin for the pump which she should have available to her. As far as the renal transplant is concerned, her renal function has been relatively stable and so this has been put off because she is not yet at the point of needing it. Her nephrologist will be evaluating her this week to determine when transplant is appropriate. I did reassure her today that she will need prednisone post transplant but that given the closeness of the match, her prednisone requirements will be minimal after the initial pulse. We spent more than 25 mintutes face to face, more than 50% was in counseling regarding diet, exercise and carbohydrate intake.

## 2019-09-09 NOTE — PROGRESS NOTES
Michoacano Guerrero is a 69 y.o. female    Chief Complaint   Patient presents with    Diabetes    Follow-up       1. Have you been to the ER, urgent care clinic since your last visit? Hospitalized since your last visit? No    2. Have you seen or consulted any other health care providers outside of the [address] [place] [address] since your last visit? Include any pap smears or colon screening.  No

## 2019-09-16 NOTE — TELEPHONE ENCOUNTER
9/16/2019  9:00 AM      Cathy pharmacist with [facility] called stating that they need a new prescription sent over written the exact same way with the diagnose code on it to bill medicare part B, if you have any questions please call [phone]    insulin lispro (HUMALOG U-100 INSULIN) 100 unit/mL injection        Fax [phone]      JADON Reyes 58, [address] [facility] [address] [address]      Thanks

## 2019-09-16 NOTE — TELEPHONE ENCOUNTER
Patient is requesting that her refill be sent to the [facility]. Pharmacist from [facility] stated to please include the diagnosis code per medicare guidelines.

## 2019-09-23 NOTE — PROGRESS NOTES
Visit Start Time: 9:45am  Presentation:   \"I bought the DexCom G6 from [employer] for $450. I'm excited to get started on the pump. Lisa Doll \"    Chetna Magana is a 69 y.o. female with Type 1 diabetes who returns for initiation of insulin pump therapy using the Tandem Basal IQ system. A1c 7.1% (June 2019). Last seen 9.16.19 by Margarito Pearson MD. Patient reports taking 14 units of Lantus each morning. She has reduced her carbohydrate intake significantly, and is using 2-3 units of Humalog insulin at most meals. She is correcting hyperglycemia appropriately. Patient has brought the Tandem insulin pump and supplies to begin this therapy. DexCom G6 is already in place, with data being sent to her Smartphone. She is eager to start this therapy so she can be confident by the time she receives her kidney transplant. She shares that the kidney transplant surgery is scheduled for November 5th. Her surgeon is Mickey Lee MD and post-transplant clinician, Ronald Herr NP. Subjective:   Diabetes Self-care Practices  Healthy Eating - Now consuming Low carbohydrate meals 3 times on most days. Using carbohydrate counting principles. Twenty-four hour/usual dietary practices include:  (B)  Fruit (I.e. 2 Pineapple galan) w/wo egg  (L)  Fruit or salad w/wo chicken; cheese  (D)  Meat, salad and low carb vegetable w/wo fruit  ([place])  Water  Being Active - Reports exercising every morning. Sees the impact of physical activity on blood glucose control  Monitoring - She is testing blood glucoses as well as a DexCom G5; switched to the DexCom G6 this past Saturday  Taking Medications - She is taking prescribed diabetes medications    Objective:   Physical Exam  General Mentally alert. Oriented to person, place and time. In no acute distress. Well-kept and well developed.  Cooperative   Visit Vitals  /79 (BP 1 Location: Left arm, BP Patient Position: Sitting)   Pulse 65   Ht 5' 4\" (1.626 m)   Wt 122 lb 3.2 oz (55.4 kg)   SpO2 97%   BMI 20.98 kg/m²     Diabetes Self-care Practices  Problemsolving - Does use blood glucose readings to make self-management decisions. Blood glucose at 630am = 199. By 11am, her reading was down to 179. At the completion of the visit, her blood sugar was 160. Healthy Coping - She is not anxious or depressed. Feels happy about starting insulin pump therapy. Enjoys seeing and using data from CGM  Reducing Risks - A1c is in range. On ASA and BP medications; not on statin therapy. BP and LDL are not at goal.  Barriers to diabetes self-management-Include diminishing kidney function    Nursing Care:     Diabetes self-management training session:    Based on insulin dosing from previous endocrinology visit, the insulin pump settings were set as follows:    Basal rate  0.6 units/hour (14.4 units per day)  Bolus ratio  1:15  Sensitivity 1:50  BG target    100  Active insulin  4 hours    Continuous Glucose Monitoring System already in use. Nursing Diagnosis 90428 Deficient Knowledge related to insulin pump therapy via Tandem insulin pump   Stage of Change  Action   Nursing Intervention Domain 5051 Decision-making Support   Nursing Interventions 1. Basic insulin pump features discussed                        2. Basal patterns entered & confirmed  3. Bolus and sensitivity factor settings entered & confirmed                 4. Blood glucose targets set  5. Reservoir loaded and tubing primed, and infusion set inserted                       6. CGM connected to insulin pump in order to communicate wirelessly       Evaluation:   Patient participated in constructing diabetes strategy using shared decision making. Patient is ready, willing, and agrees to use a Tandem insulin pump along with the DexCom G6 CGM system. Patient is adept at basic insulin pump use and can navigate buttons. Patient loaded insulin pump with three (3) days worth of insulin and inserted into right abdomen, using A-30 infusion set without incident. Patient plans to change infusion site every 3 days as recommended. Next site change due on . Patient knows how to treat BGs >250 mg/dl as well as hypoglycemia. Patient is confident in ability to do the followin. Set insulin pump to deliver ongoing basal insulin requirements  2. Use bolus function at the time of meals address mealtime insulin requirements and correct blood glucoses outside established ranges  3. Validate ability to use downloading software this evening (using t-Connect software)    Plan:   1. Diabetes insulin dosing unchanged per Robi Moran MD  2. Patient will utilize insulin pump for diabetes management  3. Follow-up call planned for   4. Routine follow-up with Dr. Kathie Stuart as previously scheduled    I spent 2 hours face-to-face with patient, 75% of which was devoted to counseling related to diabetes self-management practices and care.   Visit End Time: 12 noon    Billing code: Unbilled visit    Morris Fortune DNP RN, ACNS-BC, BC-ADM, CDE  Adult Health Clinical Nurse Specialist-Diabetes & Endocrine  Phone: [phone]  Fax:  [phone]

## 2019-11-04 NOTE — TELEPHONE ENCOUNTER
Spoke with Sera Pereira (pharmacist) at [facility] and informed her that Dr. Francisco Sigala discontinued the Humalog prescription written on 9/16/19 and that they should fill the Humalog prescription that he sent in today (11/4/19). No further actions required.

## 2019-11-04 NOTE — TELEPHONE ENCOUNTER
Bevelyn Boxer, pharmacist with [facility] is calling to talk to you about this patient's Humalog prescriptions. They received 2 prescriptions for this patient and the patient is there now. She needs a call back at:  [phone].
